# Patient Record
Sex: FEMALE | Race: BLACK OR AFRICAN AMERICAN | NOT HISPANIC OR LATINO | Employment: OTHER | RURAL
[De-identification: names, ages, dates, MRNs, and addresses within clinical notes are randomized per-mention and may not be internally consistent; named-entity substitution may affect disease eponyms.]

---

## 2023-03-15 ENCOUNTER — HOSPITAL ENCOUNTER (EMERGENCY)
Facility: HOSPITAL | Age: 78
Discharge: HOME | End: 2023-03-15
Payer: COMMERCIAL

## 2023-03-15 VITALS
SYSTOLIC BLOOD PRESSURE: 130 MMHG | HEIGHT: 61 IN | HEART RATE: 51 BPM | DIASTOLIC BLOOD PRESSURE: 64 MMHG | WEIGHT: 183 LBS | TEMPERATURE: 98 F | BODY MASS INDEX: 34.55 KG/M2 | RESPIRATION RATE: 18 BRPM | OXYGEN SATURATION: 99 %

## 2023-03-15 DIAGNOSIS — S80.00XA CONTUSION OF KNEE, UNSPECIFIED LATERALITY, INITIAL ENCOUNTER: Primary | ICD-10-CM

## 2023-03-15 PROCEDURE — 99284 PR EMERGENCY DEPT VISIT,LEVEL IV: ICD-10-PCS | Mod: EDII,,, | Performed by: NURSE PRACTITIONER

## 2023-03-15 PROCEDURE — 99284 EMERGENCY DEPT VISIT MOD MDM: CPT | Mod: GF

## 2023-03-15 PROCEDURE — 99284 EMERGENCY DEPT VISIT MOD MDM: CPT | Mod: EDII,,, | Performed by: NURSE PRACTITIONER

## 2023-03-15 PROCEDURE — 96372 THER/PROPH/DIAG INJ SC/IM: CPT | Performed by: NURSE PRACTITIONER

## 2023-03-15 PROCEDURE — 63600175 PHARM REV CODE 636 W HCPCS: Performed by: NURSE PRACTITIONER

## 2023-03-15 PROCEDURE — 99284 EMERGENCY DEPT VISIT MOD MDM: CPT

## 2023-03-15 RX ORDER — FOLIC ACID 1 MG/1
TABLET ORAL
COMMUNITY
Start: 2023-02-02

## 2023-03-15 RX ORDER — ATORVASTATIN CALCIUM 10 MG/1
TABLET, FILM COATED ORAL
COMMUNITY
Start: 2022-04-25

## 2023-03-15 RX ORDER — OMEPRAZOLE 20 MG/1
CAPSULE, DELAYED RELEASE ORAL
COMMUNITY
Start: 2022-04-25

## 2023-03-15 RX ORDER — CYCLOBENZAPRINE HCL 10 MG
TABLET ORAL
COMMUNITY
Start: 2022-05-25

## 2023-03-15 RX ORDER — DULOXETIN HYDROCHLORIDE 30 MG/1
CAPSULE, DELAYED RELEASE ORAL
COMMUNITY
Start: 2022-04-25

## 2023-03-15 RX ORDER — NAPROXEN 250 MG/1
250 TABLET ORAL
Qty: 30 TABLET | Refills: 0 | Status: SHIPPED | OUTPATIENT
Start: 2023-03-15

## 2023-03-15 RX ORDER — KETOROLAC TROMETHAMINE 30 MG/ML
30 INJECTION, SOLUTION INTRAMUSCULAR; INTRAVENOUS
Status: COMPLETED | OUTPATIENT
Start: 2023-03-15 | End: 2023-03-15

## 2023-03-15 RX ORDER — METHOTREXATE 2.5 MG/1
TABLET ORAL
COMMUNITY

## 2023-03-15 RX ORDER — LISINOPRIL 20 MG/1
TABLET ORAL
COMMUNITY
Start: 2022-11-17

## 2023-03-15 RX ORDER — CELECOXIB 200 MG/1
CAPSULE ORAL
COMMUNITY
Start: 2022-11-09

## 2023-03-15 RX ADMIN — KETOROLAC TROMETHAMINE 30 MG: 30 INJECTION, SOLUTION INTRAMUSCULAR; INTRAVENOUS at 11:03

## 2023-03-15 NOTE — ED NOTES
"Patient denies any radiation from back down to extremities.  States felt a "jarring" sensation into her back when she tripped over a pallet while shopping in a store.  States she fell forward onto bilateral knees and hands.  Denies hitting head or any other trauma.    "

## 2023-03-15 NOTE — ED NOTES
Patient unable to recall full list of medications taken, reviewed RX in system but patient unsure of full list of medications

## 2023-03-15 NOTE — ED PROVIDER NOTES
Encounter Date: 3/15/2023       History     Chief Complaint   Patient presents with    Fall     C/o tripping and falling approx 30 minutes PTA; states no loss of consciousness or syncope, states fell forward hitting bilateral knees and hands.   C/O bilateral knee pain and lower back pain, drove self to ED     76 y/o AAF presents pov with c/o bilateral knee pain and low back pain after tripping on a pallet in [place] and falling on her hands and knees minutes pta.    Review of patient's allergies indicates:   Allergen Reactions    Codeine Itching    Pcn [penicillins]      itching     Past Medical History:   Diagnosis Date    Hypertension      Past Surgical History:   Procedure Laterality Date    CHOLECYSTECTOMY      HYSTERECTOMY       History reviewed. No pertinent family history.  Social History     Tobacco Use    Smoking status: Every Day     Packs/day: 0.50     Years: 15.00     Pack years: 7.50     Types: Cigarettes    Smokeless tobacco: Never   Substance Use Topics    Alcohol use: Never    Drug use: Never     Review of Systems   Respiratory:  Negative for apnea, cough, choking, chest tightness, shortness of breath, wheezing and stridor.    Cardiovascular:  Negative for chest pain, palpitations and leg swelling.   Musculoskeletal:  Positive for arthralgias and back pain.        Bilateral knee pain   All other systems reviewed and are negative.    Physical Exam     Initial Vitals [03/15/23 1021]   BP Pulse Resp Temp SpO2   (!) 142/78 65 18 97.8 °F (36.6 °C) 98 %      MAP       --         Physical Exam    Nursing note and vitals reviewed.  Constitutional: She appears well-developed and well-nourished. No distress.   HENT:   Head: Normocephalic and atraumatic.   Eyes: Conjunctivae and EOM are normal.   Neck: Neck supple.   Normal range of motion.  Cardiovascular:  Normal rate, regular rhythm, normal heart sounds and intact distal pulses.     Exam reveals no gallop and no friction rub.       No murmur  heard.  Pulmonary/Chest: Breath sounds normal. No respiratory distress. She has no wheezes. She has no rhonchi. She has no rales. She exhibits no tenderness.   Abdominal: Abdomen is soft. Bowel sounds are normal. There is no abdominal tenderness.   Musculoskeletal:      Cervical back: Normal, normal range of motion and neck supple.      Thoracic back: Normal.      Lumbar back: Normal.      Right knee: Normal.      Left knee: Normal.     Neurological: She is alert and oriented to person, place, and time. She has normal strength.   Skin: Skin is warm and dry. Capillary refill takes less than 2 seconds.   Psychiatric: She has a normal mood and affect. Her behavior is normal. Judgment and thought content normal.       Medical Screening Exam   See Full Note    ED Course   Procedures  Labs Reviewed - No data to display       Imaging Results              X-Ray Knee 1 or 2 View Right (Final result)  Result time 03/15/23 11:09:15      Final result by Cuong Hameed DO (03/15/23 11:09:15)                   Impression:      No acute fracture or dislocation.    Ill-defined sclerosis located within the proximal diaphysis of the bilateral tibias measuring 3.3 cm on the right and 3.8 cm on the left , could represent bony infarct or enchondroma. Correlate with need for MRI of the knee without and with intravenous contrast.      Electronically signed by: Cuong Hameed  Date:    03/15/2023  Time:    11:09               Narrative:    EXAMINATION:  XR KNEE 1 OR 2 VIEW LEFT; XR KNEE 1 OR 2 VIEW RIGHT    CLINICAL HISTORY:  Fall;; FAll;    TECHNIQUE:  XR KNEE 1 OR 2 VIEW LEFT; XR KNEE 1 OR 2 VIEW RIGHT    COMPARISON:  3/15/23    FINDINGS:  No acute fracture or dislocation.    Moderate degenerative change of the compartments of the knees.    Ill-defined sclerosis located within the proximal diaphysis of the bilateral tibias measuring 3.3 cm on the right and 3.8 cm on the left , could represent bony infarct or enchondroma.   Correlate with need for MRI of the knee without and with intravenous contrast.    No radiopaque foreign bodies.                                       X-Ray Knee 1 or 2 View Left (Final result)  Result time 03/15/23 11:09:15      Final result by Cuong Hameed DO (03/15/23 11:09:15)                   Impression:      No acute fracture or dislocation.    Ill-defined sclerosis located within the proximal diaphysis of the bilateral tibias measuring 3.3 cm on the right and 3.8 cm on the left , could represent bony infarct or enchondroma. Correlate with need for MRI of the knee without and with intravenous contrast.      Electronically signed by: Cuong Hameed  Date:    03/15/2023  Time:    11:09               Narrative:    EXAMINATION:  XR KNEE 1 OR 2 VIEW LEFT; XR KNEE 1 OR 2 VIEW RIGHT    CLINICAL HISTORY:  Fall;; FAll;    TECHNIQUE:  XR KNEE 1 OR 2 VIEW LEFT; XR KNEE 1 OR 2 VIEW RIGHT    COMPARISON:  3/15/23    FINDINGS:  No acute fracture or dislocation.    Moderate degenerative change of the compartments of the knees.    Ill-defined sclerosis located within the proximal diaphysis of the bilateral tibias measuring 3.3 cm on the right and 3.8 cm on the left , could represent bony infarct or enchondroma.  Correlate with need for MRI of the knee without and with intravenous contrast.    No radiopaque foreign bodies.                                       X-Ray Lumbar Spine Ap And Lateral (Final result)  Result time 03/15/23 11:10:34      Final result by Cuong Hameed DO (03/15/23 11:10:34)                   Impression:      Questionable mild anterior wedging of L1, correlate with point tenderness.      Electronically signed by: Cuong Hameed  Date:    03/15/2023  Time:    11:10               Narrative:    EXAMINATION:  XR LUMBAR SPINE AP AND LATERAL    CLINICAL HISTORY:  Fall;.    TECHNIQUE:  XR LUMBAR SPINE AP AND LATERAL    COMPARISON:  None    FINDINGS:  Questionable mild anterior wedging of L1,  correlate with point tenderness.    No malalignment.    Moderate to severe degenerative disc disease.    Mild to moderate endplate change.  Moderate facet change.                                       Medications   ketorolac injection 30 mg (30 mg Intramuscular Given 3/15/23 1136)     Medical Decision Making:   Initial Assessment:   76 y/o AAF presents pov with c/o bilateral knee pain and low back pain after tripping on a pallet in [place] and falling on her hands and knees minutes pta.  Differential Diagnosis:   Low back Pain  Contusions Bilateral Knees  Patella Fracture  ED Management:  Patient supine on stretcher in ER # 1 in [facility].                 Clinical Impression:   Final diagnoses:  [S80.00XA] Contusion of knee, unspecified laterality, initial encounter (Primary)        ED Disposition Condition    Discharge Stable          ED Prescriptions       Medication Sig Dispense Start Date End Date Auth. Provider    naproxen (NAPROSYN) 250 MG tablet Take 1 tablet (250 mg total) by mouth every meal as needed (knee pain). 30 tablet 3/15/2023 -- SATHYA Antonio          Follow-up Information       Follow up With Specialties Details Why Contact Info    Bal Alfaro NP Family Medicine Go to  As needed, for follow up [address] [place] [address] [place] [id number]  [phone]               SATHYA Antonio  03/15/23 1226
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
## 2023-03-20 ENCOUNTER — CLINICAL SUPPORT (OUTPATIENT)
Dept: REHABILITATION | Facility: HOSPITAL | Age: 78
End: 2023-03-20
Payer: COMMERCIAL
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
## 2023-03-20 PROCEDURE — 97163 PT EVAL HIGH COMPLEX 45 MIN: CPT

## 2023-03-20 NOTE — PLAN OF CARE
[facility]  Physical Therapy Initial Evaluation    Name: Rianna Devi  Clinic Number: [id number]    Therapy Diagnosis:   Encounter Diagnosis   Name Primary?    Fall      Physician: Khruram Mcdowell MD    Physician Orders: PT Eval and Treat, Balance training  Medical Diagnosis from Referral: s/p Fall  Evaluation Date: 3/20/2023  Authorization Period Expiration: 4/28/2023    Visit # / Visits authorized: 1/ 11    Time In: 1232  Time Out: 1307  Total Appointment Time (timed & untimed codes): 35 minutes    Precautions: Fall and RA, HTN    Subjective   Date of onset: 3/15/2023  History of current condition - Rianna reports: Tripping over a pallet at a store and landing on olivier hands/knees. States the fall exacerbated her RA symptoms and she now has 10/10 pain in olivier knees, low back, and olivier shoulders. She also c/o pain in her olivier waist area.     Medical History:   Past Medical History:   Diagnosis Date    Hypertension    Rheumatoid Arthritis, per pt    Surgical History:   Rianna Devi  has a past surgical history that includes Hysterectomy and Cholecystectomy.    Medications:   Rianna has a current medication list which includes the following prescription(s): atorvastatin, celecoxib, cyclobenzaprine, duloxetine, folic acid, lisinopril, methotrexate, naproxen, and omeprazole.    Allergies:   Review of patient's allergies indicates:   Allergen Reactions    Codeine Itching    Pcn [penicillins]      itching        Imaging, xrays 3/15/23 of lumbar spine and olivier knees: no fractures/dislocation of knees, mild anterior wedging L1. No malalignment of L-spine.    Prior Therapy: none  Social History:  lives alone  Occupation: Retired  Prior Level of Function: Could do yard work, house work. Amb'd without AD.Could drive herself.  Current Level of Function: Having to amb with RW.    Pain:  Current 10/10, worst 10/10, best 10/10   Location: bilateral low back , knees , and shoulders, waists  Description: Aching, Dull, and  ""stiff and sore"  Aggravating Factors: Bending, Walking, and Getting out of bed/chair  Easing Factors: pain medication, lying down, and rest    Pts goals: Decrease pain.    Objective     Comments: tight olivier glutes, calves. Pt was self-limiting throughout ROM/MMT due to c/o pain. She has painful trigger points about olivier hips. Mild edema olivier knees.      Range of motion:  Motion Right Left    Hip flexion   55   35   Hip extension       Hip abduction  WFL  WFL   Hip adduction       Internal rotation       External rotation       Knee extension   0   0   Knee flexion   92   105   Ankle DF   5   0                      Manual muscle test   Muscle Right  Left    Hip flexion  MMT strength: 3-/5  MMT strength: 3-/5   Hip extension       Hip abduction  MMT strength: 3+/5  MMT strength: 3+/5   Hip adduction       Hip internal rotation       Hip external rotation       Knee extension  MMT strength: 4-/5  MMT strength: 3+/5   Knee flexion  MMT strength: 3-/5  MMT strength: 4-/5                         Range of motion  Motion Right  Left    Shoulder flexion 95 100   Shoulder extension     Shoulder abduction 110 105     MANUAL MUSCLE TEST  Muscle Right  Left    Shoulder flexion MMT strength: 3/5 MMT strength: 3/5   Shoulder extension     Shoulder abduction MMT strength: 3/5 MMT strength: 3/5     Assistive device: rolling walker  Ambulation distance and deviations:HOUSEHOLD distances    Comments: TINETTI BALANCE test = 14/28 using RW        Limitation/Restriction for FOTO Intake Survey    Therapist reviewed FOTO scores for Rianna Devi on 3/20/2023.   FOTO documents entered into EPIC - see Media section.    Limitation Score: 36%         TREATMENT   Treatment Time In: 1232  Treatment Time Out: 1307  Total Treatment time (time-based codes) separate from Evaluation: 5 minutes    Rianna received the treatments listed below:  HEP    Home Exercises and Patient Education Provided    Education provided:   - Pt instructed in results of eval, " "POC, treatment options.    Written Home Exercises Provided: yes. LTR, supine hip ABD/ER  Exercises were reviewed and Rianna was able to demonstrate them prior to the end of the session.  Rianna demonstrated fair  understanding of the education provided.     See EMR under Media for exercises provided 3/20/2023.    Assessment   Rianna is a 77 y.o. female referred to outpatient Physical Therapy with a medical diagnosis of s/p fall. Pt presents with decreased ROM and strength olivier shoulders and BLE. She is at high falls risk with Tinetti Balance score of only 14/28 using RW. Prior to fall, pt did not use any assistive device. She states that she has had 10/10 pain in olivier shoulders, olivier knees, low back and olivier waist since her fall five days ago, but describes her pain as "stiff and sore". Pt is very self-limiting during ROM and MMT due to her c/o pain. PT indicated to decrease pain using manual therapy and modalities, increase strength of BUE/BLE, increase ROM BUE/BLE and decrease her falls risk.    Pt prognosis is Good.   Pt will benefit from skilled outpatient Physical Therapy to address the deficits stated above and in the chart below, provide pt/family education, and to maximize pt's level of independence.     Plan of care discussed with patient: Yes  Pt's spiritual, cultural and educational needs considered and patient is agreeable to the plan of care and goals as stated below:     Anticipated Barriers for therapy: none      Goals:  Short Term Goals: 3 weeks   Pt will be indep with HEP.  Pt's Tinetti Balance score will be at least 17/28.  Pt will have at least 3+/5 olivier shoulder flex and abd.  Pt will have at least 4-/5 BLE strength.  Pt will gain at least 10 degrees ROM olivier shoulder flex and abd.  t will have ROM olivier  olivier knee flex 100 degrees, olivier hip flex 80 degrees.    Long Term Goals: 5 weeks   Pt's Tinetti Balance score will be at least 20/28.  Pt will have at least 4-/5 olivier shoulder flex and abd.  Pt will have " at least 4/5 BLE strength.  Pt will have ROM olivier shoulder flex and abd at least 120 degrees.  Pt will have ROM olivier calves 10 degrees, olivier knee flex 110 degrees, olivier hip flex 100 degrees.    Plan   Plan of care Certification: 3/20/2023 to 4/28/2023.    Outpatient Physical Therapy 2 times weekly for 5 weeks to include the following interventions: Electrical Stimulation  , Manual Therapy, Moist Heat/ Ice, Patient Education, Therapeutic Activities, Therapeutic Exercise, and Ultrasound.     Gabbie Weathers, PT     I CERTIFY THE NEED FOR THESE SERVICES FURNISHED UNDER THIS PLAN OF TREATMENT AND WHILE UNDER MY CARE.    Physician's comments:      Physician's Signature: _________________________________________  Date: __________________________

## 2023-03-22 ENCOUNTER — CLINICAL SUPPORT (OUTPATIENT)
Dept: REHABILITATION | Facility: HOSPITAL | Age: 78
End: 2023-03-22
Payer: COMMERCIAL
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
## 2023-03-22 PROCEDURE — 97110 THERAPEUTIC EXERCISES: CPT | Mod: CQ

## 2023-03-22 NOTE — PROGRESS NOTES
"[facility]   Physical Therapy Treatment Note     Name: Rianna Devi  Clinic Number: [id number]    Therapy Diagnosis:   Encounter Diagnosis   Name Primary?    Fall, sequela Yes     Physician: Khurram Mcdowell MD    Visit Date: 3/22/2023    Physician Orders: PT Eval and Treat, Balance training  Medical Diagnosis from Referral: s/p Fall  Evaluation Date: 3/20/2023  Authorization Period Expiration: 4/28/2023     Visit # / Visits authorized: 2/11     Time In: 13:12  Time Out: 14:06  Total Appointment Time (timed & untimed codes): 54 minutes (Non billable 5 minutes for MHP)     Precautions: Fall and RA, HTN       PTA Visit #: 1/5       SUBJECTIVE     Pt reports: Patient stated that her shoulder, arms, and legs were sore prior to treatment. Patient stated that she soaked in the tub with warm water and epsom salt this morning, but she stated that she is still sore. Patient stated that her pain pills just put her to sleep.   She was compliant with home exercise program.  Response to previous treatment: No treatment last visit only evaluation.   Functional change: Too early in POC    Pain: 10/10  Location: bilateral shoulders, arms, and legs      OBJECTIVE     Objective Measures updated at progress report unless specified.     Treatment     Rianna received the treatments listed below:      therapeutic exercises to develop strength and ROM for 49 minutes including:     Nustep  5 minutes L1   Seated quad stretch 2 x 30"   Seated HS stretch 2 x 30"    Lower trunk rotations  3 x 5"   Hip ADD squeezes 15x 3" hold   Hip ABD with red TB 10x   Pulleys flex/abd  2 min each with short hold at end ROM                         manual therapy techniques: NA were applied to the: NA for NA minutes    therapeutic activities to improve functional performance for NA  minutes, including:  Not today    gait training to improve functional mobility and safety for NA  minutes, including:  Not today    direct contact " modalities after being cleared for contraindications: Ultrasound:  Rianna received ultrasound to manage pain and inflammation at NA % duty cycle applied to the NA at an intensity of  number entry box W/cm2  for a duration of NA minutes. Patient tolerated treatment well without adverse effects. Therapist was in attendance throughout intervention. Not today    supervised modalities after being cleared for contradictions: IFC Electrical Stimulation:  Rianna received IFC Electrical Stimulation for pain control applied to the NA. Pt received stimulation at NA % scan at a frequency of NA for NA minutes. Rianna tolderated treatment well without any adverse effects. Not today    hot pack for 5 minutes to bilateral shoulders.    cold pack for 0 minutes to NA.        Patient Education and Home Exercises     Home Exercises Provided and Patient Education Provided     Written Home Exercises Provided: Patient instructed to cont prior HEP. Exercises were reviewed and Rianna was able to demonstrate them prior to the end of the session.  Rianna demonstrated good  understanding of the education provided. See EMR under Patient Instructions for exercises provided during therapy sessions    ASSESSMENT   Patient was provided with visual and verbal cues for proper and hold times of exercises and stretches. Patient frequently reports soreness and discomfort throughout exercises. Patient stated that her R knee popped after quad stretch, and she stated that she had pain in her groin during hip ABD. MHP applied to bilateral shoulders to decrease soreness and pain. Patient stated that her pain decreased to a 7/10 after completion of treatment.     Rianna Is progressing well towards her goals.   Pt prognosis is Good.     Pt will continue to benefit from skilled outpatient physical therapy to address the deficits listed in the problem list box on initial evaluation, provide pt/family education and to maximize pt's level of independence in the  home and community environment.     Pt's spiritual, cultural and educational needs considered and pt agreeable to plan of care and goals.     Anticipated Barriers for therapy: none        Goals:  Short Term Goals: 3 weeks   Pt will be indep with HEP.  Pt's Tinetti Balance score will be at least 17/28.  Pt will have at least 3+/5 olivier shoulder flex and abd.  Pt will have at least 4-/5 BLE strength.  Pt will gain at least 10 degrees ROM olivier shoulder flex and abd.  t will have ROM olivier  olivier knee flex 100 degrees, olivier hip flex 80 degrees.     Long Term Goals: 5 weeks   Pt's Tinetti Balance score will be at least 20/28.  Pt will have at least 4-/5 olivier shoulder flex and abd.  Pt will have at least 4/5 BLE strength.  Pt will have ROM olivier shoulder flex and abd at least 120 degrees.  Pt will have ROM olivier calves 10 degrees, olivier knee flex 110 degrees, olivier hip flex 100 degrees.    PLAN     Plan of care Certification: 3/20/2023 to 4/28/2023.     Outpatient Physical Therapy 2 times weekly for 5 weeks to include the following interventions: Electrical Stimulation  , Manual Therapy, Moist Heat/ Ice, Patient Education, Therapeutic Activities, Therapeutic Exercise, and Ultrasound.    Swapna Tovar, PTA

## 2023-03-24 ENCOUNTER — CLINICAL SUPPORT (OUTPATIENT)
Dept: REHABILITATION | Facility: HOSPITAL | Age: 78
End: 2023-03-24
Payer: COMMERCIAL
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
## 2023-03-24 PROCEDURE — 97035 APP MDLTY 1+ULTRASOUND EA 15: CPT

## 2023-03-24 PROCEDURE — 97140 MANUAL THERAPY 1/> REGIONS: CPT

## 2023-03-24 PROCEDURE — 97110 THERAPEUTIC EXERCISES: CPT

## 2023-03-24 NOTE — PROGRESS NOTES
"[facility]   Physical Therapy Treatment Note     Name: Rianna Devi  Clinic Number: [id number]    Therapy Diagnosis:   No diagnosis found.    Physician: Khurram Mcdowell MD    Visit Date: 3/24/2023    Physician Orders: PT Eval and Treat, Balance training  Medical Diagnosis from Referral: s/p Fall  Evaluation Date: 3/20/2023  Authorization Period Expiration: 4/28/2023     Visit # / Visits authorized: 3/11    Time In: 1306  Time Out: 1350  Total Appointment Time (timed & untimed codes): 44 minutes     Precautions: Fall and RA, HTN       PTA Visit #: 0/5       SUBJECTIVE     Pt reports: Patient reports using heat at neck and cold pack to knees.  She was compliant with home exercise program.  Response to previous treatment: No treatment last visit only evaluation.   Functional change: Too early in POC    Pain: 8/10  Location: left low back, left neck/shldr    OBJECTIVE     Objective Measures updated at progress report unless specified.     Treatment     Rianna received the treatments listed below:      therapeutic exercises to develop strength and ROM for 12 minutes including:     Nustep , Level 2.5 7 minutes    Seated quad stretch 2 x 30" (not today)   Seated HS stretch 2 x 30" (not today)   Lower trunk rotations  3 x 5" (not today)   Hip ADD squeezes 15x 3" hold (not today)   Hip ABD with red TB 10x   Pulleys flex/abd  2 min each with short hold at end ROM (not today)   Stretch: olivier pirif, glutes, HS (passive) 1x30" each                     manual therapy techniques: Trigger point release about left hip. Foam rolling olivier quads, olivier glutes, low back, olivier HS: for 24 minutes    therapeutic activities to improve functional performance for NA  minutes, including:  (Not today)    direct contact modalities after being cleared for contraindications: Ultrasound:  Rianna received ultrasound to manage pain and inflammation at 100 % duty cycle applied to the Left Neck/Upper trap at an intensity of  " number 1.5 W/cm2  for a duration of 7 minutes. Patient tolerated treatment well without adverse effects. Therapist was in attendance throughout intervention. Total time= 8 minutes including set up/clean up.    supervised modalities after being cleared for contradictions: IFC Electrical Stimulation:  Rianna received IFC Electrical Stimulation for pain control applied to the NA. Pt received stimulation at NA % scan at a frequency of NA for NA minutes. Rianna tolderated treatment well without any adverse effects. (Not today)    hot pack for 0 minutes to bilateral shoulders.    cold pack for 0 minutes to NA.        Patient Education and Home Exercises     Home Exercises Provided and Patient Education Provided     Written Home Exercises Provided: yes. Piriformis stretch and Glute stretch. Exercises were reviewed and Rianna was able to demonstrate them prior to the end of the session.  Rianna demonstrated good  understanding of the education provided. See EMR under Patient Instructions for exercises provided during therapy sessions    ASSESSMENT   Patient's pain in knees not present during visit. She only c/o pain in left neck/upper and left low back. Pain in these two areas decreased from 8/10 to 6/10 post-treatment. Added to HEP: glute stretch, piriformis stretch.    Rianna Is progressing well towards her goals.   Pt prognosis is Good.     Pt will continue to benefit from skilled outpatient physical therapy to address the deficits listed in the problem list box on initial evaluation, provide pt/family education and to maximize pt's level of independence in the home and community environment.     Pt's spiritual, cultural and educational needs considered and pt agreeable to plan of care and goals.     Anticipated Barriers for therapy: none        Goals:  Short Term Goals: 3 weeks   Pt will be indep with HEP.  Pt's Tinetti Balance score will be at least 17/28.  Pt will have at least 3+/5 olivier shoulder flex and abd.  Pt will  have at least 4-/5 BLE strength.  Pt will gain at least 10 degrees ROM olivier shoulder flex and abd.  Pt will have ROM olivier  olivier knee flex 100 degrees, olivier hip flex 80 degrees.     Long Term Goals: 5 weeks   Pt's Tinetti Balance score will be at least 20/28.  Pt will have at least 4-/5 olivier shoulder flex and abd.  Pt will have at least 4/5 BLE strength.  Pt will have ROM olivier shoulder flex and abd at least 120 degrees.  Pt will have ROM olivier calves 10 degrees, olivier knee flex 110 degrees, olivier hip flex 100 degrees.    PLAN     Plan of care Certification: 3/20/2023 to 4/28/2023.     Outpatient Physical Therapy 2 times weekly for 5 weeks to include the following interventions: Electrical Stimulation  , Manual Therapy, Moist Heat/ Ice, Patient Education, Therapeutic Activities, Therapeutic Exercise, and Ultrasound.    Gabbie Weathers, PT      NEXT VISIT: measure ROM olivier knee flex, hip flex, shldr flex and shldr abd. TPR to left neck/UT. MMT olivier shldr flex and abd.

## 2023-03-29 ENCOUNTER — CLINICAL SUPPORT (OUTPATIENT)
Dept: REHABILITATION | Facility: HOSPITAL | Age: 78
End: 2023-03-29
Payer: COMMERCIAL
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
## 2023-03-29 PROCEDURE — 97014 ELECTRIC STIMULATION THERAPY: CPT | Mod: CQ

## 2023-03-29 PROCEDURE — 97110 THERAPEUTIC EXERCISES: CPT | Mod: CQ

## 2023-03-29 NOTE — PROGRESS NOTES
"[facility]   Physical Therapy Treatment Note     Name: Rianna Devi  Clinic Number: [id number]    Therapy Diagnosis:   Encounter Diagnosis   Name Primary?    Fall, sequela Yes       Physician: Khurram Mcdowell MD    Visit Date: 3/29/2023    Physician Orders: PT Eval and Treat, Balance training  Medical Diagnosis from Referral: s/p Fall  Evaluation Date: 3/20/2023  Authorization Period Expiration: 4/28/2023     Visit # / Visits authorized: 4/11    Time In: 1232  Time Out: 1334  Total Appointment Time (timed & untimed codes): 58 minutes     Precautions: Fall and RA, HTN       PTA Visit #: 1/5       SUBJECTIVE     Pt reports: Patient stated that her arms are throbbing and her legs are hurting prior to treatment. Patient stated that she feels lie she does not have good.   She was compliant with home exercise program.  Response to previous treatment: No treatment last visit only evaluation.   Functional change: Too early in POC    Pain: 7/10  Location: Bilateral low back, hips, legs, and shoulders    OBJECTIVE     L knee flex: 130, R knee flex: 125 degrees     L hip flex: 90, R hip flex: 95 degrees    L shoulder flex: 142 degrees , R shoulder flex: 127 degrees    L shoulder ABD: 172 degrees ,R shoulder ABD: 172 degrees     MMT of bilateral shoulder flexion and abd: all 3+/5    Treatment     Rianna received the treatments listed below:      therapeutic exercises to develop strength and ROM for 48 minutes including:     Nustep , Level 2.5 5 minutes    Seated quad stretch 2 x 30" (not today)   Seated HS stretch 2 x 30"    Lower trunk rotations  3 x 5"    Hip ADD squeezes 15x 3" hold    Hip ABD with red TB 10x   Pulleys flex/abd  2 min each with short hold at end ROM (not today)   Stretch: glutes 1x30" each                     manual therapy techniques: Trigger point release about left hip. Foam rolling olivier quads, olivier glutes, low back, olivier HS: for 0 minutes Not today    therapeutic activities to " improve functional performance for NA  minutes, including:  (Not today)    direct contact modalities after being cleared for contraindications: Ultrasound:  Rianna received ultrasound to manage pain and inflammation at 100 % duty cycle applied to the Left Neck/Upper trap at an intensity of  number 1.5 W/cm2  for a duration of 0 minutes. Patient tolerated treatment well without adverse effects. Therapist was in attendance throughout intervention. Not today     supervised modalities after being cleared for contradictions: Biphasic Electrical Stimulation:  Rianna received Biphasic Electrical Stimulation for pain control applied to the bilateral piriformis and quadratus lumborum. Pt received stimulation on burst mode at 2pps for 10 minutes. Rianna tolderated treatment well without any adverse effects.     hot pack for 10 minutes to bilateral low back with estim.     cold pack for 0 minutes to NA.        Patient Education and Home Exercises     Home Exercises Provided and Patient Education Provided     Written Home Exercises Provided: Patient instructed to cont prior HEP. Exercises were reviewed and Rianna was able to demonstrate them prior to the end of the session.  Rianna demonstrated good  understanding of the education provided. See EMR under Patient Instructions for exercises provided during therapy sessions    ASSESSMENT   Patient met short term goals 3, 5, and 6. Patient also met long term goal 4. Patient continues to required visual, verbal, and tactile cues for proper form and hold times of therapy tasks. After completion of treatment patient stated that she felt better, and stated that her pain decreased to a 6/10.     Rianna Is progressing well towards her goals.   Pt prognosis is Good.     Pt will continue to benefit from skilled outpatient physical therapy to address the deficits listed in the problem list box on initial evaluation, provide pt/family education and to maximize pt's level of independence in  the home and community environment.     Pt's spiritual, cultural and educational needs considered and pt agreeable to plan of care and goals.     Anticipated Barriers for therapy: none        Goals:  Short Term Goals: 3 weeks   Pt will be indep with HEP.  Pt's Tinetti Balance score will be at least 17/28.  Pt will have at least 3+/5 olivier shoulder flex and abd. MET  Pt will have at least 4-/5 BLE strength.  Pt will gain at least 10 degrees ROM olivier shoulder flex and abd. MET  Pt will have ROM olivier  olivier knee flex 100 degrees, olivier hip flex 80 degrees. MET     Long Term Goals: 5 weeks   Pt's Tinetti Balance score will be at least 20/28.  Pt will have at least 4-/5 olivier shoulder flex and abd.  Pt will have at least 4/5 BLE strength.  Pt will have ROM olivier shoulder flex and abd at least 120 degrees. MET  Pt will have ROM olivier calves 10 degrees, olivier knee flex 110 degrees, olivier hip flex 100 degrees.    PLAN     Plan of care Certification: 3/20/2023 to 4/28/2023.     Outpatient Physical Therapy 2 times weekly for 5 weeks to include the following interventions: Electrical Stimulation  , Manual Therapy, Moist Heat/ Ice, Patient Education, Therapeutic Activities, Therapeutic Exercise, and Ultrasound.    Swapna Tovar, PTA

## 2023-03-31 ENCOUNTER — CLINICAL SUPPORT (OUTPATIENT)
Dept: REHABILITATION | Facility: HOSPITAL | Age: 78
End: 2023-03-31
Payer: COMMERCIAL
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
## 2023-03-31 PROCEDURE — 97110 THERAPEUTIC EXERCISES: CPT

## 2023-03-31 PROCEDURE — 97530 THERAPEUTIC ACTIVITIES: CPT

## 2023-03-31 NOTE — PROGRESS NOTES
"[facility]   Physical Therapy Treatment Note     Name: Rianna Devi  Clinic Number: [id number]    Therapy Diagnosis:   No diagnosis found.      Physician: Khurram Mcdowell MD    Visit Date: 3/31/2023    Physician Orders: PT Eval and Treat, Balance training  Medical Diagnosis from Referral: s/p Fall  Evaluation Date: 3/20/2023  Authorization Period Expiration: 4/28/2023     Visit # / Visits authorized: 5/11    Time In: 1310  Time Out: 1400  Total Appointment Time (timed & untimed codes): 50 minutes     Precautions: Fall and RA, HTN       PTA Visit #: 0/5       SUBJECTIVE     Pt reports: Patient stated that she uses QC for amb indoors and RW outdoors. C/o not feeling steady.   She was compliant with home exercise program.  Response to previous treatment: No treatment last visit only evaluation.   Functional change: Too early in POC    Pain: 6/10  Location: Bilateral knees (quads and HS), olivier deltoids    OBJECTIVE     L knee flex: 130, R knee flex: 125 degrees     L hip flex: 90, R hip flex: 95 degrees    L shoulder flex: 142 degrees , R shoulder flex: 127 degrees    L shoulder ABD: 172 degrees ,R shoulder ABD: 172 degrees     MMT of bilateral shoulder flexion and abd: all 3+/5    Treatment     Rianna received the treatments listed below:      therapeutic exercises to develop strength and ROM for  35 minutes including:     Nustep , Level 3.0 5 minutes    Seated quad stretch 2 x 30" (not today)   Seated HS stretch BLE 2 x 30"    Lower trunk rotations  3 x 5" (not today)   Hip ADD squeezes 15x 3" hold (not today)   Hip ABD with red TB 10x  (not today)   Pulleys flex/abd  2 min each with short hold at end ROM (not today)   *Stretch: glutes 1x30" each  (not today)   *Standing: MS, hip abd, PF, marching 1x10 each   BUE ex's: shldr flex 1#, shld abd 1# 1x10 each   BUE ex's: Empty Can 12 reps         manual therapy techniques: Trigger point release about left hip. Foam rolling olivier quads, olivier glutes, " "low back, [place] HS: for 0 minutes (Not today)    therapeutic activities to improve functional performance for balance 15 minutes, including:  Tandem amb fwd/bwd 3 ft x 2 reps, braiding R/L 3 ft x 2 reps, toe taps to 8" stool x 10 reps. Pt required 2 seated rest breaks.    direct contact modalities after being cleared for contraindications: Ultrasound:  Rianna received ultrasound to manage pain and inflammation at 100 % duty cycle applied to the Left Neck/Upper trap at an intensity of  number 1.5 W/cm2  for a duration of 0 minutes. Patient tolerated treatment well without adverse effects. Therapist was in attendance throughout intervention. (Not today)    supervised modalities after being cleared for contradictions: Biphasic Electrical Stimulation:  Rianna received Biphasic Electrical Stimulation for pain control applied to the bilateral piriformis and quadratus lumborum. Pt received stimulation on burst mode at 2pps for 10 minutes. Rianna tolderated treatment well without any adverse effects.  (Not today)    hot pack for 0 minutes to bilateral low back with estim.     cold pack for 0 minutes to NA.(Pt declined CP to knees stating she would use her CP at home)        Patient Education and Home Exercises     Home Exercises Provided and Patient Education Provided -pt instructed to stop performing previous HEP: LTR supine hip abd.    Written Home Exercises Provided: yes. Standing HEP: MS, PF, hip abd, marching.Exercises were reviewed and Rianna was able to demonstrate them prior to the end of the session.  Rianna demonstrated good  understanding of the education provided. See EMR under Patient Instructions for exercises provided during therapy sessions    ASSESSMENT   Patient reported [place] arm/knee pain decreased from 6/10 to 5/10 post-treatment.    Rianna Is progressing well towards her goals.   Pt prognosis is Good.     Pt will continue to benefit from skilled outpatient physical therapy to address the deficits listed in " the problem list box on initial evaluation, provide pt/family education and to maximize pt's level of independence in the home and community environment.     Pt's spiritual, cultural and educational needs considered and pt agreeable to plan of care and goals.     Anticipated Barriers for therapy: none        Goals:  Short Term Goals: 3 weeks   Pt will be indep with HEP.  Pt's Tinetti Balance score will be at least 17/28.  Pt will have at least 3+/5 olivier shoulder flex and abd.- MET  Pt will have at least 4-/5 BLE strength.-PROGRESSING  Pt will gain at least 10 degrees ROM olivier shoulder flex and abd.- MET  Pt will have ROM olivier  olivier knee flex 100 degrees, olivier hip flex 80 degrees.- MET     Long Term Goals: 5 weeks   Pt's Tinetti Balance score will be at least 20/28.  Pt will have at least 4-/5 olivier shoulder flex and abd.  Pt will have at least 4/5 BLE strength.  Pt will have ROM olivier shoulder flex and abd at least 120 degrees. MET  Pt will have ROM olivier calves 10 degrees, olivier knee flex 110 degrees, olivier hip flex 100 degrees.    PLAN     Plan of care Certification: 3/20/2023 to 4/28/2023.     Outpatient Physical Therapy 2 times weekly for 5 weeks to include the following interventions: Electrical Stimulation  , Manual Therapy, Moist Heat/ Ice, Patient Education, Therapeutic Activities, Therapeutic Exercise, and Ultrasound.    Gabbie Weathers, PT

## 2023-04-05 ENCOUNTER — CLINICAL SUPPORT (OUTPATIENT)
Dept: REHABILITATION | Facility: HOSPITAL | Age: 78
End: 2023-04-05
Payer: COMMERCIAL
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
## 2023-04-05 PROCEDURE — 97530 THERAPEUTIC ACTIVITIES: CPT | Mod: CQ

## 2023-04-05 PROCEDURE — 97110 THERAPEUTIC EXERCISES: CPT | Mod: CQ

## 2023-04-05 NOTE — PROGRESS NOTES
"[facility]   Physical Therapy Treatment Note     Name: Rianna Devi  Clinic Number: [id number]    Therapy Diagnosis:   Encounter Diagnosis   Name Primary?    Fall, sequela Yes         Physician: Khurram Mcdowell MD    Visit Date: 4/5/2023    Physician Orders: PT Eval and Treat, Balance training  Medical Diagnosis from Referral: s/p Fall  Evaluation Date: 3/20/2023  Authorization Period Expiration: 4/28/2023     Visit # / Visits authorized: 6/11    Time In: 1315  Time Out: 1403  Total Appointment Time (timed & untimed codes): 48 minutes (8 minutes not billed for [facility])     Precautions: Fall and RA, HTN       PTA Visit #: 1/5       SUBJECTIVE     Pt reports: Patient stated that her arms are feeling better, but she stated that they are still sore. Patient also stated that she had a burning pain in her back, and that her legs feel weak.   She was compliant with home exercise program.  Response to previous treatment: Patient had no adverse effects.   Functional change: Too early in POC    Pain: 6/10  Location: low back, and legs    OBJECTIVE       Treatment     Rianna received the treatments listed below:      therapeutic exercises to develop strength and ROM for 30 minutes including:     Nustep , Level 3.0 5 minutes    Seated quad stretch 2 x 30" (not today)   Seated HS stretch BLE 2 x 30"    Lower trunk rotations  3 x 5" (not today)   Hip ADD squeezes 15x 3" hold (not today)   Hip ABD with red TB 10x  (not today)   Pulleys flex/abd  2 min each with short hold at end ROM (not today)   *Stretch: glutes 1x30" each  (not today)   *Standing: MS, PF, marching 1x10 each   BUE ex's: shldr flex 1#, shld abd 1# 1x10 each   BUE ex's: Empty Can 12 reps         manual therapy techniques: Trigger point release about left hip. Foam rolling olivier quads, olivier glutes, low back, olivier HS: for 0 minutes (Not today)    therapeutic activities to improve functional performance for balance 10 minutes, including:  Tandem " "amb fwd/bwd 6 ft x 2 reps, braiding R/L 6 ft x 1 rep, toe taps to 8" stool x 10 reps.     direct contact modalities after being cleared for contraindications: Ultrasound:  Rianna received ultrasound to manage pain and inflammation at 100 % duty cycle applied to the Left Neck/Upper trap at an intensity of  number 1.5 W/cm2  for a duration of 0 minutes. Patient tolerated treatment well without adverse effects. Therapist was in attendance throughout intervention. (Not today)    supervised modalities after being cleared for contradictions: Biphasic Electrical Stimulation:  Rianna received Biphasic Electrical Stimulation for pain control applied to the bilateral piriformis and quadratus lumborum. Pt received stimulation on burst mode at 2pps for 0 minutes. Rianna tolderated treatment well without any adverse effects.  (Not today)    hot pack for 8 minutes to bilateral low back.     cold pack for 0 minutes to NA.        Patient Education and Home Exercises     Home Exercises Provided and Patient Education Provided     Written Home Exercises Provided: Patient instructed to cont prior HEP. Exercises were reviewed and Rianna was able to demonstrate them prior to the end of the session.  Rianna demonstrated good  understanding of the education provided. See EMR under Patient Instructions for exercises provided during therapy sessions    ASSESSMENT   Patient was provided with visual and verbal cues for proper form and hold times of exercises and stretches. Patient required occasional rest breaks due to fatigue and pain. After completion of treatment patient stated that her back felt better.     Rianna Is progressing well towards her goals.   Pt prognosis is Good.     Pt will continue to benefit from skilled outpatient physical therapy to address the deficits listed in the problem list box on initial evaluation, provide pt/family education and to maximize pt's level of independence in the home and community environment. "     Pt's spiritual, cultural and educational needs considered and pt agreeable to plan of care and goals.     Anticipated Barriers for therapy: none        Goals:  Short Term Goals: 3 weeks   Pt will be indep with HEP.  Pt's Tinetti Balance score will be at least 17/28.  Pt will have at least 3+/5 olivier shoulder flex and abd.- MET  Pt will have at least 4-/5 BLE strength.-PROGRESSING  Pt will gain at least 10 degrees ROM olivier shoulder flex and abd.- MET  Pt will have ROM olivier  olivier knee flex 100 degrees, olivier hip flex 80 degrees.- MET     Long Term Goals: 5 weeks   Pt's Tinetti Balance score will be at least 20/28.  Pt will have at least 4-/5 olivier shoulder flex and abd.  Pt will have at least 4/5 BLE strength.  Pt will have ROM olivier shoulder flex and abd at least 120 degrees. MET  Pt will have ROM olivier calves 10 degrees, olivier knee flex 110 degrees, olivier hip flex 100 degrees.    PLAN     Plan of care Certification: 3/20/2023 to 4/28/2023.     Outpatient Physical Therapy 2 times weekly for 5 weeks to include the following interventions: Electrical Stimulation  , Manual Therapy, Moist Heat/ Ice, Patient Education, Therapeutic Activities, Therapeutic Exercise, and Ultrasound.    Swapna Tovar, PTA

## 2023-04-06 ENCOUNTER — CLINICAL SUPPORT (OUTPATIENT)
Dept: REHABILITATION | Facility: HOSPITAL | Age: 78
End: 2023-04-06
Payer: COMMERCIAL
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
## 2023-04-06 PROCEDURE — 97530 THERAPEUTIC ACTIVITIES: CPT | Mod: CQ

## 2023-04-06 PROCEDURE — 97110 THERAPEUTIC EXERCISES: CPT | Mod: CQ

## 2023-04-06 NOTE — PROGRESS NOTES
"[facility]   Physical Therapy Treatment Note     Name: Rianna Devi  Clinic Number: [id number]    Therapy Diagnosis:   Encounter Diagnosis   Name Primary?    Fall, sequela Yes         Physician: Khurrma Mcdowell MD    Visit Date: 4/6/2023    Physician Orders: PT Eval and Treat, Balance training  Medical Diagnosis from Referral: s/p Fall  Evaluation Date: 3/20/2023  Authorization Period Expiration: 4/28/2023     Visit # / Visits authorized: 7/11    Time In: 1314  Time Out: 1405  Total Appointment Time (timed & untimed codes): 51 minutes (8 minutes not billed for [facility]HP)     Precautions: Fall and RA, HTN       PTA Visit #: 2/5       SUBJECTIVE     Pt reports: Patient stated that she "burns" all over. Patient stated that she is going to see her doctor tomorrow.   She was compliant with home exercise program.  Response to previous treatment: Patient had no adverse effects.   Functional change: Too early in POC    Pain: 6/10  Location: low back, and legs    OBJECTIVE       Treatment     Rianna received the treatments listed below:      therapeutic exercises to develop strength and ROM for 33 minutes including:     Nustep , Level 2.0 7 minutes    Seated quad stretch 2 x 30" (not today)   Seated HS stretch BLE 2 x 30"    Lower trunk rotations  3 x 5" (not today)   Hip ADD squeezes 15x 3" hold (not today)   Hip ABD with red TB 10x  (not today)   Pulleys flex/abd  2 min each with short hold at end ROM (not today)   *Stretch: glutes 1x30" each  (not today)   *Standing: MS, PF, marching 15x each   BUE ex's: shldr flex 1#, shld abd 1# 15x each   BUE ex's: Empty Can 15 reps         manual therapy techniques: Trigger point release about left hip. Foam rolling olivier quads, olivier glutes, low back, olivier HS: for 0 minutes (Not today)    therapeutic activities to improve functional performance for balance 10 minutes, including:  Tandem amb fwd/bwd 6 ft x 2 reps, braiding R/L 6 ft x 1 rep, toe taps to 8" stool x 10 " "reps.     direct contact modalities after being cleared for contraindications: Ultrasound:  Rianna received ultrasound to manage pain and inflammation at 100 % duty cycle applied to the Left Neck/Upper trap at an intensity of  number 1.5 W/cm2  for a duration of 0 minutes. Patient tolerated treatment well without adverse effects. Therapist was in attendance throughout intervention. (Not today)    supervised modalities after being cleared for contradictions: Biphasic Electrical Stimulation:  Rianna received Biphasic Electrical Stimulation for pain control applied to the bilateral piriformis and quadratus lumborum. Pt received stimulation on burst mode at 2pps for 0 minutes. Rianna tolderated treatment well without any adverse effects.  (Not today)    hot pack for 8 minutes to bilateral back.     cold pack for 0 minutes to NA.        Patient Education and Home Exercises     Home Exercises Provided and Patient Education Provided     Written Home Exercises Provided: Patient instructed to cont prior HEP. Exercises were reviewed and Rianna was able to demonstrate them prior to the end of the session.  Rianna demonstrated good  understanding of the education provided. See EMR under Patient Instructions for exercises provided during therapy sessions    ASSESSMENT   Patient was provided with visual and verbal cues for proper form and hold times of therapy task. Patient required occasional rest breaks due to fatigue and pain. After completion of treatment patient stated that her back felt "pretty good."    Rianna Is progressing well towards her goals.   Pt prognosis is Good.     Pt will continue to benefit from skilled outpatient physical therapy to address the deficits listed in the problem list box on initial evaluation, provide pt/family education and to maximize pt's level of independence in the home and community environment.     Pt's spiritual, cultural and educational needs considered and pt agreeable to plan of care " and goals.     Anticipated Barriers for therapy: none        Goals:  Short Term Goals: 3 weeks   Pt will be indep with HEP.  Pt's Tinetti Balance score will be at least 17/28.  Pt will have at least 3+/5 olivier shoulder flex and abd.- MET  Pt will have at least 4-/5 BLE strength.-PROGRESSING  Pt will gain at least 10 degrees ROM olivier shoulder flex and abd.- MET  Pt will have ROM loivier  olivier knee flex 100 degrees, olivier hip flex 80 degrees.- MET     Long Term Goals: 5 weeks   Pt's Tinetti Balance score will be at least 20/28.  Pt will have at least 4-/5 olivier shoulder flex and abd.  Pt will have at least 4/5 BLE strength.  Pt will have ROM olivier shoulder flex and abd at least 120 degrees. MET  Pt will have ROM olivier calves 10 degrees, olivier knee flex 110 degrees, olivier hip flex 100 degrees.    PLAN     Plan of care Certification: 3/20/2023 to 4/28/2023.     Outpatient Physical Therapy 2 times weekly for 5 weeks to include the following interventions: Electrical Stimulation  , Manual Therapy, Moist Heat/ Ice, Patient Education, Therapeutic Activities, Therapeutic Exercise, and Ultrasound.    Swapna Tovar, PTA

## 2023-04-10 ENCOUNTER — DOCUMENTATION ONLY (OUTPATIENT)
Dept: REHABILITATION | Facility: HOSPITAL | Age: 78
End: 2023-04-10
Payer: COMMERCIAL

## 2023-04-12 ENCOUNTER — CLINICAL SUPPORT (OUTPATIENT)
Dept: REHABILITATION | Facility: HOSPITAL | Age: 78
End: 2023-04-12
Payer: COMMERCIAL
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
## 2023-04-12 PROCEDURE — 97530 THERAPEUTIC ACTIVITIES: CPT | Mod: CQ

## 2023-04-12 PROCEDURE — 97110 THERAPEUTIC EXERCISES: CPT | Mod: CQ

## 2023-04-12 PROCEDURE — 97140 MANUAL THERAPY 1/> REGIONS: CPT | Mod: CQ

## 2023-04-12 NOTE — PROGRESS NOTES
"[facility]   Physical Therapy Treatment Note     Name: Rianna Devi  Clinic Number: [id number]    Therapy Diagnosis:   Encounter Diagnosis   Name Primary?    Fall, sequela Yes         Physician: Khurram Mcdowell MD    Visit Date: 4/12/2023    Physician Orders: PT Eval and Treat, Balance training  Medical Diagnosis from Referral: s/p Fall  Evaluation Date: 3/20/2023  Authorization Period Expiration: 4/28/2023     Visit # / Visits authorized: 8/11    Time In: 1318  Time Out: 1402  Total Appointment Time (timed & untimed codes): 44 minutes     Precautions: Fall and RA, HTN       PTA Visit #: 3/5       SUBJECTIVE     Pt reports: Patient stated that she is sore in her arms and legs. Patient stated that her doctor gave her a muscle relaxer.    She was compliant with home exercise program.  Response to previous treatment: Patient had no adverse effects.   Functional change: Patient now walking on quad cane.     Pain: 5/10  Location: BUE, BLE     OBJECTIVE       Treatment     Rianna received the treatments listed below:      therapeutic exercises to develop strength and ROM for 26 minutes including:     Nustep , Level 2.0 7 minutes    Seated quad stretch 2 x 30" (not today)   Seated HS stretch BLE 2 x 30"    Lower trunk rotations  3 x 5" (not today)   Hip ADD squeezes 15x 3" hold (not today)   Hip ABD with red TB 10x  (not today)   Pulleys flex/abd  2 min each with short hold at end ROM (not today)   *Stretch: glutes 1x30" each  (not today)   *Standing: MS, PF, marching 15x each   BUE ex's: shldr flex 1#, shld abd 1# 15x each   BUE ex's: Empty Can 15 reps         manual therapy techniques: Foam rolling olivier quads, olivier glutes, low back, olivier HS, olivier calves: for 10 minutes    therapeutic activities to improve functional performance for balance 8 minutes, including:  Tandem amb fwd/bwd 6 ft x 2 reps, braiding R/L 6 ft x 2 rep, toe taps to 8" stool x 15 reps.     direct contact modalities after being " "cleared for contraindications: Ultrasound:  Rianna received ultrasound to manage pain and inflammation at 100 % duty cycle applied to the Left Neck/Upper trap at an intensity of  number 1.5 W/cm2  for a duration of 0 minutes. Patient tolerated treatment well without adverse effects. Therapist was in attendance throughout intervention. (Not today)    supervised modalities after being cleared for contradictions: Biphasic Electrical Stimulation:  Rianna received Biphasic Electrical Stimulation for pain control applied to the bilateral piriformis and quadratus lumborum. Pt received stimulation on burst mode at 2pps for 0 minutes. Rianna tolderated treatment well without any adverse effects.  (Not today)    hot pack for 0 minutes to bilateral back. NA     cold pack for 0 minutes to NA.        Patient Education and Home Exercises     Home Exercises Provided and Patient Education Provided     Written Home Exercises Provided: Patient instructed to cont prior HEP. Exercises were reviewed and Rianna was able to demonstrate them prior to the end of the session.  Rianna demonstrated good  understanding of the education provided. See EMR under Patient Instructions for exercises provided during therapy sessions    ASSESSMENT   Patient was provided with visual and verbal cues for proper form and hold times of therapy task. Patient required occasional rest breaks due to fatigue and pain. Patient stated that she feels "pretty good," but she stated that she had no change in pain after completion of treatment.     Rianna Is progressing well towards her goals.   Pt prognosis is Good.     Pt will continue to benefit from skilled outpatient physical therapy to address the deficits listed in the problem list box on initial evaluation, provide pt/family education and to maximize pt's level of independence in the home and community environment.     Pt's spiritual, cultural and educational needs considered and pt agreeable to plan of care " and goals.     Anticipated Barriers for therapy: none        Goals:  Short Term Goals: 3 weeks   Pt will be indep with HEP.  Pt's Tinetti Balance score will be at least 17/28.  Pt will have at least 3+/5 olivier shoulder flex and abd.- MET  Pt will have at least 4-/5 BLE strength.-PROGRESSING  Pt will gain at least 10 degrees ROM olivier shoulder flex and abd.- MET  Pt will have ROM olivier  olivier knee flex 100 degrees, olivier hip flex 80 degrees.- MET     Long Term Goals: 5 weeks   Pt's Tinetti Balance score will be at least 20/28.  Pt will have at least 4-/5 olivier shoulder flex and abd.  Pt will have at least 4/5 BLE strength.  Pt will have ROM olivier shoulder flex and abd at least 120 degrees. MET  Pt will have ROM olivier calves 10 degrees, olivier knee flex 110 degrees, olivier hip flex 100 degrees.    PLAN     Plan of care Certification: 3/20/2023 to 4/28/2023.     Outpatient Physical Therapy 2 times weekly for 5 weeks to include the following interventions: Electrical Stimulation  , Manual Therapy, Moist Heat/ Ice, Patient Education, Therapeutic Activities, Therapeutic Exercise, and Ultrasound.    Swapna Tovar, PTA

## 2023-04-17 ENCOUNTER — CLINICAL SUPPORT (OUTPATIENT)
Dept: REHABILITATION | Facility: HOSPITAL | Age: 78
End: 2023-04-17
Payer: COMMERCIAL
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
## 2023-04-17 PROCEDURE — 97140 MANUAL THERAPY 1/> REGIONS: CPT | Mod: CQ

## 2023-04-17 PROCEDURE — 97110 THERAPEUTIC EXERCISES: CPT | Mod: CQ

## 2023-04-17 PROCEDURE — 97530 THERAPEUTIC ACTIVITIES: CPT | Mod: CQ

## 2023-04-17 NOTE — PROGRESS NOTES
"[facility]   Physical Therapy Treatment Note     Name: Rianna Devi  Clinic Number: [id number]    Therapy Diagnosis:   Encounter Diagnosis   Name Primary?    Fall, sequela Yes         Physician: Khurram Mcdowell MD    Visit Date: 4/17/2023    Physician Orders: PT Eval and Treat, Balance training  Medical Diagnosis from Referral: s/p Fall  Evaluation Date: 3/20/2023  Authorization Period Expiration: 4/28/2023     Visit # / Visits authorized: 9/11    Time In: 1313  Time Out: 1359  Total Appointment Time (timed & untimed codes): 46 minutes     Precautions: Fall and RA, HTN       PTA Visit #: 4/5       SUBJECTIVE     Pt reports: Patient stated that she had pain in her arms and legs.   She was compliant with home exercise program.  Response to previous treatment: Patient had no adverse effects.   Functional change: Patient now walking on quad cane.     Pain: 3/10, 4/10  Location: BUE, BLE     OBJECTIVE       Treatment     Rianna received the treatments listed below:      therapeutic exercises to develop strength and ROM for 28 minutes including:     Nustep , Level 2.0 8 minutes    Seated quad stretch 2 x 30" (not today)   Seated HS stretch BLE 2 x 30"    Lower trunk rotations  3 x 5" (not today)   Hip ADD squeezes 15x 3" hold (not today)   Hip ABD with red TB 10x  (not today)   Pulleys flex/abd  2 min each with short hold at end ROM (not today)   *Stretch: glutes 1x30" each  (not today)   *Standing: MS, PF, marching 15x each   BUE ex's: shldr flex 1#, shld abd 1# 15x each   BUE ex's: Empty Can 15 reps   BUE: bicep curls 2# 10x     manual therapy techniques: Foam rolling olivier quads, olivier glutes, low back, olivier HS, olivier calves, olivier IT band: for 10 minutes    therapeutic activities to improve functional performance for balance 8 minutes, including:  Tandem amb fwd/bwd 6 ft x 2 reps, braiding R/L 6 ft x 2 rep, toe taps to 8" stool x 15 reps.     direct contact modalities after being cleared for " "contraindications: Ultrasound:  Rianna received ultrasound to manage pain and inflammation at 100 % duty cycle applied to the Left Neck/Upper trap at an intensity of  number 1.5 W/cm2  for a duration of 0 minutes. Patient tolerated treatment well without adverse effects. Therapist was in attendance throughout intervention. (Not today)    supervised modalities after being cleared for contradictions: Biphasic Electrical Stimulation:  Rianna received Biphasic Electrical Stimulation for pain control applied to the bilateral piriformis and quadratus lumborum. Pt received stimulation on burst mode at 2pps for 0 minutes. Rianna tolderated treatment well without any adverse effects.  (Not today)    hot pack for 0 minutes to bilateral back. NA     cold pack for 0 minutes to NA.        Patient Education and Home Exercises     Home Exercises Provided and Patient Education Provided     Written Home Exercises Provided: Patient instructed to cont prior HEP. Exercises were reviewed and Rianna was able to demonstrate them prior to the end of the session.  Rianna demonstrated good  understanding of the education provided. See EMR under Patient Instructions for exercises provided during therapy sessions    ASSESSMENT   Patient required a rest break while on nustep due to reports of a cramp in her R HS, she stated that it improved after a short time. Patient required occasional rest breaks due to fatigue and pain. Patient stated that she feels "pretty good" after completion of treatment.     Rianna Is progressing well towards her goals.   Pt prognosis is Good.     Pt will continue to benefit from skilled outpatient physical therapy to address the deficits listed in the problem list box on initial evaluation, provide pt/family education and to maximize pt's level of independence in the home and community environment.     Pt's spiritual, cultural and educational needs considered and pt agreeable to plan of care and goals.   "   Anticipated Barriers for therapy: none        Goals:  Short Term Goals: 3 weeks   Pt will be indep with HEP.  Pt's Tinetti Balance score will be at least 17/28.  Pt will have at least 3+/5 olivier shoulder flex and abd.- MET  Pt will have at least 4-/5 BLE strength.-PROGRESSING  Pt will gain at least 10 degrees ROM olivier shoulder flex and abd.- MET  Pt will have ROM olivier  olivier knee flex 100 degrees, olivier hip flex 80 degrees.- MET     Long Term Goals: 5 weeks   Pt's Tinetti Balance score will be at least 20/28.  Pt will have at least 4-/5 olivier shoulder flex and abd.  Pt will have at least 4/5 BLE strength.  Pt will have ROM olivier shoulder flex and abd at least 120 degrees. MET  Pt will have ROM olivier calves 10 degrees, olivier knee flex 110 degrees, olivier hip flex 100 degrees.    PLAN     Plan of care Certification: 3/20/2023 to 4/28/2023.     Outpatient Physical Therapy 2 times weekly for 5 weeks to include the following interventions: Electrical Stimulation  , Manual Therapy, Moist Heat/ Ice, Patient Education, Therapeutic Activities, Therapeutic Exercise, and Ultrasound.    Swapna Tovar, PTA

## 2023-04-19 ENCOUNTER — CLINICAL SUPPORT (OUTPATIENT)
Dept: REHABILITATION | Facility: HOSPITAL | Age: 78
End: 2023-04-19
Payer: COMMERCIAL
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
## 2023-04-19 PROCEDURE — 97110 THERAPEUTIC EXERCISES: CPT

## 2023-04-19 NOTE — PLAN OF CARE
Reasons for Recertification of Therapy: Pt would benefit from cont'd PT to further improve balance to decrease falls risk, cont with strengthening BUE/BLE and to progress her from QC to SC. She has not, yet, met all her goals, but has improved from using a RW to now a NBQC.       Plan     Updated Certification Period: 4/19/2023 to 5/19/2023  Recommended Treatment Plan: 2 times per week for 3 weeks: Electrical Stimulation  , Manual Therapy, Moist Heat/ Ice, Patient Education, Therapeutic Activities, Therapeutic Exercise, and Ultrasound    Other Recommendations: Extend 3 more weeks    Gabbie Weathers, PT  4/19/2023      I CERTIFY THE NEED FOR THESE SERVICES FURNISHED UNDER THIS PLAN OF TREATMENT AND WHILE UNDER MY CARE.    Physician's comments:      Physician's Signature: ___________________________________________________

## 2023-04-19 NOTE — PROGRESS NOTES
"[facility]   Physical Therapy Treatment Note     Name: Rianna Devi  Clinic Number: [id number]    Therapy Diagnosis:   Encounter Diagnosis   Name Primary?    Fall, sequela Yes         Physician: Khurram Mcdowell MD    Visit Date: 4/19/2023    Physician Orders: PT Eval and Treat, Balance training  Medical Diagnosis from Referral: s/p Fall  Evaluation Date: 3/20/2023  Authorization Period Expiration: 4/28/2023     Visit # / Visits authorized: 10/11 (missed visit on 4/10/23)    Time In: 1330  Time Out: 1404  Total Appointment Time (timed & untimed codes): 34 minutes     Precautions: Fall and RA, HTN       PTA Visit #: 0/5       SUBJECTIVE     Pt reports: Patient stated that she had pain in her arms and legs.   She was compliant with home exercise program.  Response to previous treatment: Patient had no adverse effects.   Functional change: Patient now walking on quad cane.     Pain: 3/10, 5/10  Location: LUE, BLE -hips to ankles    OBJECTIVE       Treatment     Rianna received the treatments listed below:      therapeutic exercises to develop strength and ROM for 29 minutes including:     Nustep , Level 2.0 6.5 minutes    Seated quad stretch 2 x 30" (not today)   Seated HS stretch BLE 2 x 30"    Lower trunk rotations  3 x 5" (not today)   Hip ADD squeezes 15x 3" hold (not today)   Hip ABD with red TB 10x  (not today)   Pulleys flex/abd  2 min each with short hold at end ROM (not today)   *Stretch: glutes 2x30" each  (seated)   *Standing: MS, PF, marching 15x each   BUE ex's: shldr flex 1#, shld abd 1# 15x each   BUE ex's: Empty Can 20 reps   BUE: bicep curls 2# 15x     TINETTI BALANCE ASSESSMENT TOOL    BALANCE SECTION  Patient is seated in hard, armless chair.    1.Sitting Balance: Steady; safe = 1  2.Rises from chair: Able, without using arms = 2  3.Attempts to arise: Able to arise, 1 attempt = 2  4.Immediate standing Balance (first 5 seconds): Steady but uses walker or other support = " "1  5.Standing balance: Steady but wide stance (medal heel>4" apart) & uses cane or other support = 1  6.Nudged: Begins to fall = 0  7.Eyes closed: Steady = 1  8.Turning 360 degrees: Continuous = 1 and Steady = 1  9.Sitting Down: Uses arms or not a smooth motion = 1    Balance Score: 10/16    GAIT SECTION  Patient stands with therapist, walks across room (+/- aids), first at usual pace, then at rapid pace.    10.Initiation of Gait (Immediately after told to go.): No hesitancy = 1  11.Step length and height: Step through R=1 and Step through L=1  12.Foot Clearance: L foot clears floor=1 and R foot clears floor=1  13.Step symmetry: Right & Left step length not equal (estimate) = 0  14.Step continuity: Steps appear continuous = 1  15.Path: Mild/moderate deviation or uses walking aid = 1  16.Trunk: Marked sway or uses walking aid = 0  17.Walking Time: Heels apart = 0    Gait Score: 7/12  Total Score (Balance + Gait Score): 17/28 using NBQC     manual therapy techniques: Foam rolling olivier quads, olivier glutes, low back, olivier HS, olivier calves, olivier IT band: for 0 minutes (NOT TODAY)    therapeutic activities to improve functional performance for balance 5 minutes, including:  . --TINETTI TEST only    direct contact modalities after being cleared for contraindications: Ultrasound:  Rianna received ultrasound to manage pain and inflammation at 100 % duty cycle applied to the Left Neck/Upper trap at an intensity of  number 1.5 W/cm2  for a duration of 0 minutes. Patient tolerated treatment well without adverse effects. Therapist was in attendance throughout intervention. (Not today)    supervised modalities after being cleared for contradictions: Biphasic Electrical Stimulation:  Rianna received Biphasic Electrical Stimulation for pain control applied to the bilateral piriformis and quadratus lumborum. Pt received stimulation on burst mode at 2pps for 0 minutes. Rianna tolderated treatment well without any adverse effects.  (Not " today)    hot pack for 0 minutes to bilateral back. NA     cold pack for 0 minutes to NA.        Patient Education and Home Exercises     Home Exercises Provided and Patient Education Provided     Written Home Exercises Provided: Patient instructed to cont prior HEP. Exercises were reviewed and Rianna was able to demonstrate them prior to the end of the session.  Rianna demonstrated good  understanding of the education provided. See EMR under Patient Instructions for exercises provided during therapy sessions    ASSESSMENT   Patient has used up her visits, but has not met all her goals. Pt would benefit from cont'd PT to further improve balance to decrease falls risk, cont with strengthening BUE/BLE and to progress her from QC to SC.    Rianna Is progressing well towards her goals.   Pt prognosis is Good.     Pt will continue to benefit from skilled outpatient physical therapy to address the deficits listed in the problem list box on initial evaluation, provide pt/family education and to maximize pt's level of independence in the home and community environment.     Pt's spiritual, cultural and educational needs considered and pt agreeable to plan of care and goals.     Anticipated Barriers for therapy: none        Goals:  Short Term Goals: 3 weeks   Pt will be indep with HEP.-met  Pt's Tinetti Balance score will be at least 17/28.-MET  Pt will have at least 3+/5 olivier shoulder flex and abd.- MET  Pt will have at least 4-/5 BLE strength.-PROGRESSING  Pt will gain at least 10 degrees ROM olivier shoulder flex and abd.- MET  Pt will have ROM olivier  olivier knee flex 100 degrees, olivier hip flex 80 degrees.- MET     Long Term Goals: 5 weeks   Pt's Tinetti Balance score will be at least 20/28.  Pt will have at least 4-/5 olivier shoulder flex and abd.  Pt will have at least 4/5 BLE strength.  Pt will have ROM olivier shoulder flex and abd at least 120 degrees. MET  Pt will have ROM olivier calves 10 degrees, olivier knee flex 110 degrees, olivier hip flex  100 degrees.    PLAN     Plan of care Certification: 3/20/2023 to 4/28/2023.     Outpatient Physical Therapy 2 times weekly for 5 weeks to include the following interventions: Electrical Stimulation  , Manual Therapy, Moist Heat/ Ice, Patient Education, Therapeutic Activities, Therapeutic Exercise, and Ultrasound.    Gabbie Weathers PT

## 2023-05-02 ENCOUNTER — CLINICAL SUPPORT (OUTPATIENT)
Dept: REHABILITATION | Facility: HOSPITAL | Age: 78
End: 2023-05-02
Payer: COMMERCIAL
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
## 2023-05-02 PROCEDURE — 97110 THERAPEUTIC EXERCISES: CPT

## 2023-05-02 PROCEDURE — 97140 MANUAL THERAPY 1/> REGIONS: CPT

## 2023-05-02 PROCEDURE — 97530 THERAPEUTIC ACTIVITIES: CPT

## 2023-05-02 NOTE — PROGRESS NOTES
"[facility]   Physical Therapy Treatment Note     Name: Rianna Devi  Clinic Number: [id number]    Therapy Diagnosis:   No diagnosis found.        Physician: Khurram Mcdowell MD    Visit Date: 5/2/2023    Physician Orders: PT Eval and Treat, Balance training  Medical Diagnosis from Referral: s/p Fall  Evaluation Date: 3/20/2023  Authorization Period Expiration: 5/19/2023     Visit # / Visits authorized: 11/17 (missed visit on 4/10/23)    Time In: 1106  Time Out: 1148  Total Appointment Time (timed & untimed codes): 42 minutes     Precautions: Fall and RA, HTN       PTA Visit #: 0/5       SUBJECTIVE     Pt reports: Patient stated that her"BUE are doing much better." States she has been referred by PCP to pain mgt who told her that her fall "flared up my RA." C/o pain olivier knees and olivier Achilles tendon. Pt states that she does not have any plans to return to Dr. Khurram Mcdowell in the next few months.  She was compliant with home exercise program.  Response to previous treatment: Patient had no adverse effects.   Functional change: Patient now walking on quad cane.     Pain: 5/10  Location: olivier knees and olivier Achilles tendon    OBJECTIVE        Manual muscle test: 5/2/23  Muscle Right     Left    Hip flexion  MMT strength: 4/5    MMT strength: 4/5      Hip extension       Hip abduction  MMT strength: 5/5    MMT strength: 5/5     Hip adduction                          3/5                          3/5   Ankle DF                           3+/5                           4-/5   Ankle PF                           3+/5                          4/5   Knee extension  MMT strength: 4+/5   MMT strength: 4+/5    Knee flexion  MMT strength: 4/5   MMT strength: 4/5       SHLDR FLEX                                  4/5                                       4/5  SHLDR ABD                                    4-/5                                      4/5         Range of motion:  Motion Right Left    Hip flexion   " "WFL   WFL   Hip extension       Hip abduction  WFL  WFL   Hip adduction       Internal rotation       External rotation       Knee extension   0   0   Knee flexion   130   120   Ankle DF   15   10     Treatment     Rianna received the treatments listed below:      therapeutic exercises to develop strength and ROM for 22 minutes including: MMT, ROM    Nustep , Level 2.0 8 minutes    Seated quad stretch 2 x 30" (not today)   Seated HS stretch BLE 2 x 30" (not today)   Lower trunk rotations  3 x 5" (not today)   Hip ADD squeezes 15x 3" hold (not today)   Hip ABD with red TB 10x  (not today)   Pulleys flex/abd  2 min each with short hold at end ROM (not today)   *Stretch: glutes 2x30" each  (seated)   *Standing: MS, PF, marching 15x each (not today)   BUE ex's: shldr flex 1#, shld abd 1# 15x each (not today)   BUE ex's: Empty Can 20 reps (not today)   BUE: bicep curls 2# 15x (not today)     TINETTI BALANCE ASSESSMENT TOOL: 5/2/23    BALANCE SECTION  Patient is seated in hard, armless chair.    1.Sitting Balance: Steady; safe = 1  2.Rises from chair: Able, uses arms to help = 1  3.Attempts to arise: Able to arise, 1 attempt = 2  4.Immediate standing Balance (first 5 seconds): Steady but uses walker or other support = 1  5.Standing balance: Steady but wide stance (medal heel>4" apart) & uses cane or other support = 1  6.Nudged: Begins to fall = 0  7.Eyes closed: Steady = 1  8.Turning 360 degrees: Continuous = 1 and Steady = 1  9.Sitting Down: Uses arms or not a smooth motion = 1    Balance Score: 9/16    GAIT SECTION  Patient stands with therapist, walks across room (+/- aids), first at usual pace, then at rapid pace.    10.Initiation of Gait (Immediately after told to go.): No hesitancy = 1  11.Step length and height: Step through R=1 and Step through L=1  12.Foot Clearance: L foot clears floor=1 and R foot clears floor=1  13.Step symmetry: Right & Left step length appear equal = 1  14.Step continuity: Steps appear " continuous = 1  15.Path: Mild/moderate deviation or uses walking aid = 1  16.Trunk: Marked sway or uses walking aid = 0  17.Walking Time: Heels apart = 0    Gait Score: 8/12  Total Score (Balance + Gait Score): 17/28 using NBQC     manual therapy techniques: Foam rolling olivier quads, olivier glutes, low back, olivier HS, olivier calves, olivier IT band: for 10 minutes     therapeutic activities to improve functional performance for balance 10 minutes, including:  . --TINETTI TEST only    direct contact modalities after being cleared for contraindications: Ultrasound:  Rianna received ultrasound to manage pain and inflammation at 100 % duty cycle applied to the Left Neck/Upper trap at an intensity of  number 1.5 W/cm2  for a duration of 0 minutes. Patient tolerated treatment well without adverse effects. Therapist was in attendance throughout intervention. (Not today)    supervised modalities after being cleared for contradictions: Biphasic Electrical Stimulation:  Rianna received Biphasic Electrical Stimulation for pain control applied to the bilateral piriformis and quadratus lumborum. Pt received stimulation on burst mode at 2pps for 0 minutes. Rianna tolderated treatment well without any adverse effects.  (Not today)    hot pack for 0 minutes to bilateral back. NA     cold pack for 0 minutes to NA.        Patient Education and Home Exercises     Home Exercises Provided and Patient Education Provided     Written Home Exercises Provided: Patient instructed to cont prior HEP. Exercises were reviewed and Rianna was able to demonstrate them prior to the end of the session.  Rianna demonstrated good  understanding of the education provided. See EMR under Patient Instructions for exercises provided during therapy sessions    ASSESSMENT   Patient has met her strength and ROM goals for BUE. She is now being extended 3 more weeks under orders from Jeanna Aiken NP of [facility] of to address pain and weakness of olivier  calves/Achilles tendons, olivier knee pain and to further decrease falls risk to low level as she continues to be at high falls risk.     Rianna Is progressing well towards her goals.   Pt prognosis is Good.     Pt will continue to benefit from skilled outpatient physical therapy to address the deficits listed in the problem list box on initial evaluation, provide pt/family education and to maximize pt's level of independence in the home and community environment.     Pt's spiritual, cultural and educational needs considered and pt agreeable to plan of care and goals.     Anticipated Barriers for therapy: none        Goals:  Short Term Goals: 3 weeks   Pt will be indep with HEP.-MET  Pt's Tinetti Balance score will be at least 17/28.-MET  Pt will have at least 3+/5 olivier shoulder flex and abd.- MET  Pt will have at least 4-/5 BLE strength.-MET EXCEPT FOR CALVES  Pt will gain at least 10 degrees ROM olivier shoulder flex and abd.- MET  Pt will have ROM olivier  olivier knee flex 100 degrees, olivier hip flex 80 degrees.- MET     Long Term Goals: 8 weeks   Pt's Tinetti Balance score will be at least 19/28.  Pt will have at least 4-/5 olivier shoulder flex and abd.-MET  Pt will have at least 4/5 BLE strength.  Pt will have ROM olivier shoulder flex and abd at least 120 degrees. MET  Pt will have ROM olivier calves 10 degrees, olivier knee flex 110 degrees, olivier hip flex 100 degrees.-MET    PLAN     Plan of care Certification: 5/2/2023 to 5/19/2023.     Outpatient Physical Therapy 2 times weekly for 3 weeks to include the following interventions: Electrical Stimulation  , Manual Therapy, Moist Heat/ Ice, Patient Education, Therapeutic Activities, Therapeutic Exercise, and Ultrasound.    Gabbie Weathers, PT

## 2023-05-04 ENCOUNTER — CLINICAL SUPPORT (OUTPATIENT)
Dept: REHABILITATION | Facility: HOSPITAL | Age: 78
End: 2023-05-04
Payer: COMMERCIAL
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
## 2023-05-04 PROCEDURE — 97530 THERAPEUTIC ACTIVITIES: CPT | Mod: CQ

## 2023-05-04 PROCEDURE — 97110 THERAPEUTIC EXERCISES: CPT | Mod: CQ

## 2023-05-04 NOTE — PROGRESS NOTES
"[facility]   Physical Therapy Treatment Note     Name: Rianna Devi  Clinic Number: [id number]    Therapy Diagnosis:   Encounter Diagnosis   Name Primary?    Fall, sequela Yes           Physician: Khurram Mcdowell MD    Visit Date: 5/4/2023    Physician Orders: PT Eval and Treat, Balance training  Medical Diagnosis from Referral: s/p Fall  Evaluation Date: 3/20/2023  Authorization Period Expiration: 5/19/2023     Visit # / Visits authorized: 12/17 (missed visit on 4/10/23)    Time In: 1318  Time Out: 1357  Total Appointment Time (timed & untimed codes): 39 minutes     Precautions: Fall and RA, HTN       PTA Visit #: 1/5       SUBJECTIVE     Pt reports: Patient stated that her arms are better.   She was compliant with home exercise program.  Response to previous treatment: Patient had no adverse effects.   Functional change: Patient now walking on quad cane.     Pain: 5/10  Location: olivier knees and calves     OBJECTIVE       Treatment     Rianna received the treatments listed below:      therapeutic exercises to develop strength and ROM for 29 minutes including:     Nustep , Level 2.0 8 minutes    Seated quad stretch 2 x 30" (not today)   Seated HS stretch BLE 2 x 30"   Lower trunk rotations  3 x 5" (not today)   Hip ADD squeezes 15x 3" hold (not today)   Hip ABD with red TB 10x  (not today)   Pulleys flex/abd  2 min each with short hold at end ROM (not today)   *Stretch: glutes seated  1 x 30" and 2 x 15"   *Standing: MS, PF, marching, ABD 15x each    BUE ex's: shldr flex 1#, shld abd 1# 15x each (not today)   BUE ex's: Empty Can 20 reps (not today)   BUE: bicep curls 2# 15x (not today)        manual therapy techniques: Foam rolling olivier quads, olivier glutes, low back, olivier HS, olivier calves, olivier IT band: for 0 minutes (Not today)    therapeutic activities to improve functional performance for balance 10 minutes, including: tandem walking 6 feet fwd/bwd x 2 trials, double braiding L and R 6 feet x " "2 trials, single leg stance 15" x 2 on BLE with finger tip support     direct contact modalities after being cleared for contraindications: Ultrasound:  Rianna received ultrasound to manage pain and inflammation at 100 % duty cycle applied to the Left Neck/Upper trap at an intensity of  number 1.5 W/cm2  for a duration of 0 minutes. Patient tolerated treatment well without adverse effects. Therapist was in attendance throughout intervention. (Not today)    supervised modalities after being cleared for contradictions: Biphasic Electrical Stimulation:  Rianna received Biphasic Electrical Stimulation for pain control applied to the bilateral piriformis and quadratus lumborum. Pt received stimulation on burst mode at 2pps for 0 minutes. Rianna tolderated treatment well without any adverse effects.  (Not today)    hot pack for 0 minutes to bilateral back. NA     cold pack for 0 minutes to NA.        Patient Education and Home Exercises     Home Exercises Provided and Patient Education Provided     Written Home Exercises Provided: Patient instructed to cont prior HEP. Exercises were reviewed and Rianna was able to demonstrate them prior to the end of the session.  Rianna demonstrated good  understanding of the education provided. See EMR under Patient Instructions for exercises provided during therapy sessions    ASSESSMENT   Patient required visual and verbal cues for proper form and hold times of exercises and stretches. Patient required occasional rest breaks due to fatigue throughout treatment. Patient stated, "I feel pretty good" after completion of treatment.     Rianna Is progressing well towards her goals.   Pt prognosis is Good.     Pt will continue to benefit from skilled outpatient physical therapy to address the deficits listed in the problem list box on initial evaluation, provide pt/family education and to maximize pt's level of independence in the home and community environment.     Pt's spiritual, cultural " and educational needs considered and pt agreeable to plan of care and goals.     Anticipated Barriers for therapy: none        Goals:  Short Term Goals: 3 weeks   Pt will be indep with HEP.-MET  Pt's Tinetti Balance score will be at least 17/28.-MET  Pt will have at least 3+/5 olivier shoulder flex and abd.- MET  Pt will have at least 4-/5 BLE strength.-MET EXCEPT FOR CALVES  Pt will gain at least 10 degrees ROM olivier shoulder flex and abd.- MET  Pt will have ROM olivier  olivier knee flex 100 degrees, olivier hip flex 80 degrees.- MET     Long Term Goals: 8 weeks   Pt's Tinetti Balance score will be at least 19/28.  Pt will have at least 4-/5 olivier shoulder flex and abd.-MET  Pt will have at least 4/5 BLE strength.  Pt will have ROM olivier shoulder flex and abd at least 120 degrees. MET  Pt will have ROM olivier calves 10 degrees, olivier knee flex 110 degrees, olivier hip flex 100 degrees.-MET    PLAN     Plan of care Certification: 5/2/2023 to 5/19/2023.     Outpatient Physical Therapy 2 times weekly for 3 weeks to include the following interventions: Electrical Stimulation  , Manual Therapy, Moist Heat/ Ice, Patient Education, Therapeutic Activities, Therapeutic Exercise, and Ultrasound.    Swapna Tovar, PTA

## 2023-05-09 ENCOUNTER — CLINICAL SUPPORT (OUTPATIENT)
Dept: REHABILITATION | Facility: HOSPITAL | Age: 78
End: 2023-05-09
Payer: COMMERCIAL
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
## 2023-05-09 PROCEDURE — 97530 THERAPEUTIC ACTIVITIES: CPT | Mod: CQ

## 2023-05-09 PROCEDURE — 97110 THERAPEUTIC EXERCISES: CPT | Mod: CQ

## 2023-05-09 NOTE — PROGRESS NOTES
"[facility]   Physical Therapy Treatment Note     Name: Rianna Devi  Clinic Number: [id number]    Therapy Diagnosis:   No diagnosis found.          Physician: Khurram Mcdowell MD    Visit Date: 5/9/2023    Physician Orders: PT Eval and Treat, Balance training  Medical Diagnosis from Referral: s/p Fall  Evaluation Date: 3/20/2023  Authorization Period Expiration: 5/19/2023     Visit # / Visits authorized: 13/17 (missed visit on 4/10/23)    Time In: 1312  Time Out: 1352  Total Appointment Time (timed & untimed codes): 40 minutes     Precautions: Fall and RA, HTN       PTA Visit #: 2/5       SUBJECTIVE     Pt reports: Patient stated, "I feel pretty good today." Patient stated that she was sore behind her knees. Patient stated that her R ankle was hurting at home, so she has a brace on today.  She was compliant with home exercise program.  Response to previous treatment: Patient had no adverse effects.   Functional change: Patient now walking on quad cane.     Pain: 4/10  Location: low back and R knee    OBJECTIVE       Treatment     Rianna received the treatments listed below:      therapeutic exercises to develop strength and ROM for 30 minutes including:     Nustep , Level 2.0 8 minutes    Seated quad stretch 2 x 30" (not today)   Seated HS stretch BLE 2 x 30"   Lower trunk rotations  3 x 5" (not today)   Hip ADD squeezes 15x 3" hold (not today)   Hip ABD with red TB 10x  (not today)   Pulleys flex/abd  2 min each with short hold at end ROM (not today)   *Stretch: glutes  2 x 30" (not today)   *Standing: MS, PF, marching, ABD 20x each    Step ups on 4 in step 10x on BLE   BUE ex's: Empty Can 20 reps (not today)   BUE: bicep curls 2# 15x (not today)        manual therapy techniques: Foam rolling olivier quads, olivier glutes, low back, olivier HS, olivier calves, olivier IT band: for 0 minutes (Not today)    therapeutic activities to improve functional performance for balance 10 minutes, including: tandem " "walking 6 feet fwd/bwd x 2 trials, double braiding L and R 6 feet x 2 trials, single leg stance 15" x 1 on RLE with finger tip support.    direct contact modalities after being cleared for contraindications: Ultrasound:  Rianna received ultrasound to manage pain and inflammation at 100 % duty cycle applied to the Left Neck/Upper trap at an intensity of  number 1.5 W/cm2  for a duration of 0 minutes. Patient tolerated treatment well without adverse effects. Therapist was in attendance throughout intervention. (Not today)    supervised modalities after being cleared for contradictions: Biphasic Electrical Stimulation:  Rianna received Biphasic Electrical Stimulation for pain control applied to the bilateral piriformis and quadratus lumborum. Pt received stimulation on burst mode at 2pps for 0 minutes. Rianna tolderated treatment well without any adverse effects.  (Not today)    hot pack for 0 minutes to bilateral back. NA     cold pack for 0 minutes to NA.        Patient Education and Home Exercises     Home Exercises Provided and Patient Education Provided     Patient Education: Proper ambulation with quad cane    Written Home Exercises Provided: Patient instructed to cont prior HEP. Exercises were reviewed and Rianna was able to demonstrate them prior to the end of the session.  Rianna demonstrated good  understanding of the education provided. See Media for exercises provided during therapy sessions    ASSESSMENT   Patient required visual and verbal cues for proper form and hold times of exercises and stretches. Patient required occasional rest breaks due to fatigue throughout treatment. Patient's R knee buckled while performing single leg stance, so activity stopped. Patient with no adverse effects to treatment.     Rianna Is progressing well towards her goals.   Pt prognosis is Good.     Pt will continue to benefit from skilled outpatient physical therapy to address the deficits listed in the problem list box on " initial evaluation, provide pt/family education and to maximize pt's level of independence in the home and community environment.     Pt's spiritual, cultural and educational needs considered and pt agreeable to plan of care and goals.     Anticipated Barriers for therapy: none        Goals:  Short Term Goals: 3 weeks   Pt will be indep with HEP.-MET  Pt's Tinetti Balance score will be at least 17/28.-MET  Pt will have at least 3+/5 olivier shoulder flex and abd.- MET  Pt will have at least 4-/5 BLE strength.-MET EXCEPT FOR CALVES  Pt will gain at least 10 degrees ROM olivier shoulder flex and abd.- MET  Pt will have ROM olivier  olivier knee flex 100 degrees, olivier hip flex 80 degrees.- MET     Long Term Goals: 8 weeks   Pt's Tinetti Balance score will be at least 19/28.  Pt will have at least 4-/5 olivier shoulder flex and abd.-MET  Pt will have at least 4/5 BLE strength.  Pt will have ROM olivier shoulder flex and abd at least 120 degrees. MET  Pt will have ROM olivier calves 10 degrees, olivier knee flex 110 degrees, olivier hip flex 100 degrees.-MET    PLAN     Plan of care Certification: 5/2/2023 to 5/19/2023.     Outpatient Physical Therapy 2 times weekly for 3 weeks to include the following interventions: Electrical Stimulation  , Manual Therapy, Moist Heat/ Ice, Patient Education, Therapeutic Activities, Therapeutic Exercise, and Ultrasound.    Swapna Tovar, PTA

## 2023-05-11 ENCOUNTER — CLINICAL SUPPORT (OUTPATIENT)
Dept: REHABILITATION | Facility: HOSPITAL | Age: 78
End: 2023-05-11
Payer: COMMERCIAL
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
## 2023-05-11 PROCEDURE — 97110 THERAPEUTIC EXERCISES: CPT | Mod: CQ

## 2023-05-11 PROCEDURE — 97530 THERAPEUTIC ACTIVITIES: CPT | Mod: CQ

## 2023-05-11 NOTE — PROGRESS NOTES
"[facility]   Physical Therapy Treatment Note     Name: Rianna Devi  Clinic Number: [id number]    Therapy Diagnosis:   Encounter Diagnosis   Name Primary?    Fall, sequela Yes             Physician: Khurram Mcdowell MD    Visit Date: 5/11/2023    Physician Orders: PT Eval and Treat, Balance training  Medical Diagnosis from Referral: s/p Fall  Evaluation Date: 3/20/2023  Authorization Period Expiration: 5/19/2023     Visit # / Visits authorized: 14/17 (missed visit on 4/10/23)    Time In: 1315  Time Out: 1355  Total Appointment Time (timed & untimed codes): 40 minutes     Precautions: Fall and RA, HTN       PTA Visit #: 3/5       SUBJECTIVE     Pt reports: Patient stated that she had pain in her R knee and ankle prior to treatment.   She was compliant with home exercise program.  Response to previous treatment: Patient had no adverse effects.   Functional change: Patient now walking on quad cane.     Pain: 3/10  Location: R knee and ankle     OBJECTIVE       Treatment     Rianna received the treatments listed below:      therapeutic exercises to develop strength and ROM for 30 minutes including:     Nustep , Level 2.0 9 minutes    Seated quad stretch 2 x 30" (not today)   Seated HS stretch BLE 2 x 30"   Lower trunk rotations  3 x 5" (not today)   Hip ADD squeezes 15x 3" hold (not today)   Hip ABD with red TB 10x  (not today)   Pulleys flex/abd  2 min each with short hold at end ROM (not today)   *Stretch: glutes  2 x 30" (not today)   *Standing: MS, PF, marching, ABD 20x each    Step ups on 4 in step 10x on BLE   BUE ex's: Empty Can 20 reps (not today)   BUE: bicep curls 2# 15x (not today)        manual therapy techniques: Foam rolling olivier quads, olivier glutes, low back, olivier HS, olivier calves, olivier IT band: for 0 minutes (Not today)    therapeutic activities to improve functional performance for balance 10 minutes, including: tandem walking 6 feet fwd/bwd x 2 trials, double braiding L and R 6 feet " "x 2 trials, single leg stance 20" x1 on LLE, standing on blue foam 20" x2 with WBOS and 20"x2 with NBOS    direct contact modalities after being cleared for contraindications: Ultrasound:  Rianna received ultrasound to manage pain and inflammation at 100 % duty cycle applied to the Left Neck/Upper trap at an intensity of  number 1.5 W/cm2  for a duration of 0 minutes. Patient tolerated treatment well without adverse effects. Therapist was in attendance throughout intervention. (Not today)    supervised modalities after being cleared for contradictions: Biphasic Electrical Stimulation:  Rianna received Biphasic Electrical Stimulation for pain control applied to the bilateral piriformis and quadratus lumborum. Pt received stimulation on burst mode at 2pps for 0 minutes. Rianna tolderated treatment well without any adverse effects.  (Not today)    hot pack for 0 minutes to bilateral back. NA     cold pack for 0 minutes to NA.        Patient Education and Home Exercises     Home Exercises Provided and Patient Education Provided     Written Home Exercises Provided: Patient instructed to cont prior HEP. Exercises were reviewed and Rianna was able to demonstrate them prior to the end of the session.  Rianna demonstrated good  understanding of the education provided. See Media for exercises provided during therapy sessions    ASSESSMENT   Patient required visual and verbal cues for proper form and hold times of exercises and stretches. Patient required occasional rest breaks due to fatigue throughout treatment. Patient's R knee was buckling while performing single leg stance, so activity stopped. Patient with no adverse effects to treatment.     Rianna Is progressing well towards her goals.   Pt prognosis is Good.     Pt will continue to benefit from skilled outpatient physical therapy to address the deficits listed in the problem list box on initial evaluation, provide pt/family education and to maximize pt's level of " independence in the home and community environment.     Pt's spiritual, cultural and educational needs considered and pt agreeable to plan of care and goals.     Anticipated Barriers for therapy: none        Goals:  Short Term Goals: 3 weeks   Pt will be indep with HEP.-MET  Pt's Tinetti Balance score will be at least 17/28.-MET  Pt will have at least 3+/5 olivier shoulder flex and abd.- MET  Pt will have at least 4-/5 BLE strength.-MET EXCEPT FOR CALVES  Pt will gain at least 10 degrees ROM olivier shoulder flex and abd.- MET  Pt will have ROM olivier  olivier knee flex 100 degrees, olivier hip flex 80 degrees.- MET     Long Term Goals: 8 weeks   Pt's Tinetti Balance score will be at least 19/28.  Pt will have at least 4-/5 olivier shoulder flex and abd.-MET  Pt will have at least 4/5 BLE strength.  Pt will have ROM olivier shoulder flex and abd at least 120 degrees. MET  Pt will have ROM olivier calves 10 degrees, olivier knee flex 110 degrees, olivier hip flex 100 degrees.-MET    PLAN     Plan of care Certification: 5/2/2023 to 5/19/2023.     Outpatient Physical Therapy 2 times weekly for 3 weeks to include the following interventions: Electrical Stimulation  , Manual Therapy, Moist Heat/ Ice, Patient Education, Therapeutic Activities, Therapeutic Exercise, and Ultrasound.    Swapna Tovar, PTA

## 2023-05-16 ENCOUNTER — CLINICAL SUPPORT (OUTPATIENT)
Dept: REHABILITATION | Facility: HOSPITAL | Age: 78
End: 2023-05-16
Payer: COMMERCIAL
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
## 2023-05-16 PROCEDURE — 97110 THERAPEUTIC EXERCISES: CPT

## 2023-05-16 PROCEDURE — 97014 ELECTRIC STIMULATION THERAPY: CPT

## 2023-05-16 PROCEDURE — 97140 MANUAL THERAPY 1/> REGIONS: CPT

## 2023-05-16 NOTE — PLAN OF CARE
PHYSICAL THERAPY DISCHARGE:    This patient was originally evaluated at our facility on: 3/20/2023         Pt attended PT for a total of 15/17 Visits receiving: Manual Therapy, Therex, Postural Education, Body Mechanics Training, Home Exercise Instruction     This patient's last visit occurred on: 5/16/2023      Short term goals were achieved: 1-6  Pt will be indep with HEP.-MET  Pt's Tinetti Balance score will be at least 17/28.-MET  Pt will have at least 3+/5 olivier shoulder flex and abd.- MET  Pt will have at least 4-/5 BLE strength.-MET   Pt will gain at least 10 degrees ROM olivier shoulder flex and abd.- MET  Pt will have ROM olivier  olivier knee flex 100 degrees, olivier hip flex 80 degrees.- MET    Long term goals were achieved: 2, 4, 5. Pt partially met 3, but did not meet 1.  Pt's Tinetti Balance score will be at least 19/28.-NOT MET. Tinett 17/28  Pt will have at least 4-/5 olivier shoulder flex and abd.-MET  Pt will have at least 4/5 BLE strength.-MET EXCEPT HS  Pt will have ROM olivier shoulder flex and abd at least 120 degrees. MET  Pt will have ROM olivier calves 10 degrees, olivier knee flex 110 degrees, olivier hip flex 100 degrees.-MET    Pt was DC'd from our care secondary to: pt was scheduled to be discharge 5/18/23, but requested to be dc'd today because she has an MRI schedule on 5/18/23. Pt cont's to c/o pain in right knee, ankle and low back.       Therapist: Gabbie Weathers, PT  5/16/2023

## 2023-05-16 NOTE — PROGRESS NOTES
"[facility]   Physical Therapy Treatment Note     Name: Rianna Devi  Clinic Number: [id number]    Therapy Diagnosis:   No diagnosis found.            Physician: Khurram Mcdowell MD    Visit Date: 5/16/2023    Physician Orders: PT Eval and Treat, Balance training  Medical Diagnosis from Referral: s/p Fall  Evaluation Date: 3/20/2023  Authorization Period Expiration: 5/19/2023     Visit # / Visits authorized: 15/17 (missed visit on 4/10/23)    Time In: 1309  Time Out: 1355  Total Appointment Time (timed & untimed codes): 46 minutes     Precautions: Fall and RA, HTN       PTA Visit #: 0/5       SUBJECTIVE     Pt reports: Patient requests to discharge from PT today because Thursday she has an MRI scheduled due to cont'd pain in right knee, ankle and low back from her original fall.  She was compliant with home exercise program.  Response to previous treatment: Patient had no adverse effects.   Functional change: Patient now walking on quad cane.     Pain: 6/10  Location: R knee and ankle, low back    OBJECTIVE   Manual muscle test   Muscle Right              5/16/23 Left             5/16/23   Hip flexion  MMT strength: 4/5  MMT strength: 4/5   Hip extension       Hip abduction  MMT strength: 4+/5  MMT strength: 4+/5   Hip adduction       Ankle DF                            4/5                           4/5     Ankle PF                            4/5                           4/5   Knee extension  MMT strength: 4+5  MMT strength: 4+/5   Knee flexion  MMT strength: 4-/5  MMT strength: 4/5     TINETTI BALANCE ASSESSMENT TOOL    BALANCE SECTION  Patient is seated in hard, armless chair.    1.Sitting Balance: Steady; safe = 1  2.Rises from chair: Able, uses arms to help = 1  3.Attempts to arise: Able to arise, 1 attempt = 2  4.Immediate standing Balance (first 5 seconds): Steady but uses walker or other support = 1  5.Standing balance: Steady but wide stance (medal heel>4" apart) & uses cane or " "other support = 1  6.Nudged: Begins to fall = 0  7.Eyes closed: Steady = 1  8.Turning 360 degrees: Discontinuous steps = 0 and Steady = 1  9.Sitting Down: Uses arms or not a smooth motion = 1    Balance Score: 9/16    GAIT SECTION  Patient stands with therapist, walks across room (+/- aids), first at usual pace, then at rapid pace.    10.Initiation of Gait (Immediately after told to go.): No hesitancy = 1  11.Step length and height: Step through R=1 and Step through L=1  12.Foot Clearance: L foot clears floor=1 and R foot clears floor=1  13.Step symmetry: Right & Left step length appear equal = 1  14.Step continuity: Steps appear continuous = 1  15.Path: Mild/moderate deviation or uses walking aid = 1  16.Trunk: Marked sway or uses walking aid = 0  17.Walking Time: Heels apart = 0    Gait Score: 8/12  Total Score (Balance + Gait Score): 17/28    Treatment     Rianna received the treatments listed below:      therapeutic exercises to develop strength and ROM for 19 minutes including: MMT BLE    Nustep , Level 2.0 10 minutes    Seated quad stretch 2 x 30" (not today)   Seated HS stretch BLE 2 x 30" (not today)   Lower trunk rotations  3 x 5" (not today)   Hip ADD squeezes 15x 3" hold (not today)   Hip ABD with red TB 10x  (not today)   Pulleys flex/abd  2 min each with short hold at end ROM (not today)   *Stretch: glutes  2 x 30" (not today)   *Standing: MS, PF, marching, ABD 20x each (not today)   Step ups on 4 in step 10x on BLE (not today)   BUE ex's: Empty Can 20 reps (not today)   BUE: bicep curls 2# 15x (not today)        manual therapy techniques: Foam rolling olivier quads, olivier glutes, low back, olivier HS, olivier calves, olivier IT band: for 10 minutes     therapeutic activities to improve functional performance for balance 5 minutes, including: reassessing Tinetti Balance test.    direct contact modalities after being cleared for contraindications: Ultrasound:  Rianna received ultrasound to manage pain and inflammation " at 100 % duty cycle applied to the Left Neck/Upper trap at an intensity of  number 1.5 W/cm2  for a duration of 0 minutes. Patient tolerated treatment well without adverse effects. Therapist was in attendance throughout intervention. (Not today)    supervised modalities after being cleared for contradictions: Rianna received IFC for pain/edema control applied to the Right knee for 12 minutes. Rianna tolderated treatment well without any adverse effects.     hot pack for 0 minutes to bilateral back. NA     cold pack for 12 minutes to right knee during IFC.        Patient Education and Home Exercises     Home Exercises Provided and Patient Education Provided -pt instructed to cont with HEP issued.    Written Home Exercises Provided: Patient instructed to cont prior HEP. Exercises were reviewed and Rianna was able to demonstrate them prior to the end of the session.  Rianna demonstrated good  understanding of the education provided. See Media for exercises provided during therapy sessions    ASSESSMENT   Patient is being dc'd this visit per her request. Right knee pain decreased from 6/10 to 4/10 post-treatment. Pt fully met STG 1-6 and LTG 2,4,5 and partially met LTG 3. Pt did not meet LTG 1 due to pt self-limiting due to pain c/o.    Rianna Is progressing well towards her goals.   Pt prognosis is Good.     Pt will continue to benefit from skilled outpatient physical therapy to address the deficits listed in the problem list box on initial evaluation, provide pt/family education and to maximize pt's level of independence in the home and community environment.     Pt's spiritual, cultural and educational needs considered and pt agreeable to plan of care and goals.     Anticipated Barriers for therapy: none        Goals:  Short Term Goals: 3 weeks   Pt will be indep with HEP.-MET  Pt's Tinetti Balance score will be at least 17/28.-MET  Pt will have at least 3+/5 olivier shoulder flex and abd.- MET  Pt will have at least  4-/5 BLE strength.-MET   Pt will gain at least 10 degrees ROM olivier shoulder flex and abd.- MET  Pt will have ROM olivier  olivier knee flex 100 degrees, olivier hip flex 80 degrees.- MET     Long Term Goals: 8 weeks   Pt's Tinetti Balance score will be at least 19/28.-NOT MET  Pt will have at least 4-/5 olivier shoulder flex and abd.-MET  Pt will have at least 4/5 BLE strength.-MET EXCEPT HS  Pt will have ROM olivier shoulder flex and abd at least 120 degrees. MET  Pt will have ROM olivier calves 10 degrees, olivier knee flex 110 degrees, olivier hip flex 100 degrees.-MET    PLAN     Pt is discharged from Physical Therapy.     Plan of care Certification: 5/2/2023 to 5/19/2023.     Outpatient Physical Therapy 2 times weekly for 3 weeks to include the following interventions: Electrical Stimulation  , Manual Therapy, Moist Heat/ Ice, Patient Education, Therapeutic Activities, Therapeutic Exercise, and Ultrasound.    Gabbie Weathers, PT

## 2024-02-14 DIAGNOSIS — M54.50 LOW BACK PAIN, UNSPECIFIED: Primary | ICD-10-CM

## 2024-02-15 ENCOUNTER — CLINICAL SUPPORT (OUTPATIENT)
Dept: REHABILITATION | Facility: HOSPITAL | Age: 79
End: 2024-02-15
Payer: COMMERCIAL

## 2024-02-15 DIAGNOSIS — G89.29 CHRONIC LEFT-SIDED LOW BACK PAIN WITH LEFT-SIDED SCIATICA: Primary | ICD-10-CM

## 2024-02-15 DIAGNOSIS — M54.50 LOW BACK PAIN, UNSPECIFIED: ICD-10-CM

## 2024-02-15 DIAGNOSIS — M54.42 CHRONIC LEFT-SIDED LOW BACK PAIN WITH LEFT-SIDED SCIATICA: Primary | ICD-10-CM

## 2024-02-15 PROCEDURE — 97163 PT EVAL HIGH COMPLEX 45 MIN: CPT

## 2024-02-15 NOTE — PROGRESS NOTES
"[facility]  Physical Therapy Initial Evaluation    Name: Rianna Devi  Clinic Number: [id number]    Therapy Diagnosis: No diagnosis found.  Physician: Jeanna Aiken FNP    Physician Orders: {AMB PT KNEE ORDERS:13784} ***  Medical Diagnosis from Referral: LBP, unspecified  Evaluation Date: 2/15/2024  Authorization Period Expiration: ***  Visit # / Visits authorized: 1/ ***    Time In: ***  Time Out: ***  Total Appointment Time (timed & untimed codes): *** minutes    Precautions: Fall and RA, HTN     Subjective     Date of onset: 2024??  History of current condition - Rianna reports: ***     Medical History:   Past Medical History:   Diagnosis Date    Hypertension    Rheumatoid Arthritis    Surgical History:   Rianna Devi  has a past surgical history that includes Hysterectomy and Cholecystectomy.    Medications:   Rianna has a current medication list which includes the following prescription(s): atorvastatin, celecoxib, cyclobenzaprine, duloxetine, folic acid, lisinopril, methotrexate, naproxen, and omeprazole.    Allergies:   Review of patient's allergies indicates:   Allergen Reactions    Codeine Itching    Pcn [penicillins]      itching        Imaging:   {DMJIMAGIN}    Prior Therapy: Pt received outpt PT at [facility] March-May 2023 for balance, and UE/LE pain  Social History: lives alone   Occupation: retired  Prior Level of Function: Could do yard work, house work. Amb'd without AD.Could drive herself.   Current Level of Function: ***    Pain:  Current {0-10:::"0"}/10, worst {0-10:::"0"}/10, best {0-10:::"0"}/10   Location: {RIGHT LEFT BILATERAL:79724} {LOCATION ON BODY:79317}  Description: {Pain Description:45795}  Aggravating Factors: {Causes; Pain:63343}  Easing Factors: {Pain (activities that relieve):46463}    Pts goals: ***    Objective     Posture:  Standing lordosis: {amsincreasedecrease:59063}  Sitting lordosis: {amsincreasedecrease:19347}  Iliac crest " height: {DMJLEFTRIGHTINCREASEDEQUAL:61987:x}  PSIS height: {DMJLEFTRIGHTINCREASEDEQUAL:48066}  Pelvic rotation/torsion: {YES/NO:20267}  Scoliosis: {YES/NO:20267}  Lateral shift: {RUSH DESC RIGHT/LEFT:72835}    Trunk range of motion:  Thoracolumbar AROM Pain/Dysfunction with Movement   Flexion {DMJWFL:93439}    Extension {DMJWFL:20103}    Right lateral flexion {DMJWFL:43935}    Left lateral flexion {DMJWFL:18946}    Right rotation {DMJWFL:60750}    Left rotation {DMJWFL:51391}      Manual muscle test:  Muscle Right left   Hip flexion {MMT number:39328:::1} {MMT strength:25193:::1}   Hip abduction {MMT strength:42145:::1} {MMT strength:03495:::1}   Knee extension {MMT strength:40911:::1} {MMT strength:93239:::1}   Knee flexion  {MMT strength:92131:::1} {MMT strength:30179:::1}   Ankle dorsiflexion {MMT strength:29120:::1} {MMT strength:89866:::1}   Ankle plantar flexion  {MMT strength:16905:::1} {MMT strength:37706:::1}   Extensor hallucis longus {MMT strength:28567:::1} {MMT strength:59710:::1}     Hip/knee range of motion:  Motion Right Left    Hip flexion (120) {DMJWFL:25385} {DMJWFL:20014}   Hip extension {DMJWFL:01464} {DMJWFL:91716}   Hip abduction {DMJWFL:27040} {DMJWFL:28134}   Hip adduction {DMJWFL:10443} {DMJWFL:86944}   Internal rotation (45) {DMJWFL:39169} {DMJWFL:91848}   External rotation (45) {DMJWFL:20916} {DMJWFL:79583}   Hamstring 90/90 (-10) {DMJWFL:87750} {DMJWFL:61957}   Knee extension {DMJWFL:81284} {DMJWFL:14784}   Knee flexion (120) {DMJWFL:29504} {DMJWFL:46194}   Other *** ***   Other *** ***     Clinical Special Tests:  SLR test < 60 degrees: right {POSITIVE/NEGATIVE:79510}; left {POSITIVE/NEGATIVE:02504}  SLR test > 60 degrees: right {POSITIVE/NEGATIVE:20807}; left {POSITIVE/NEGATIVE:04374}  Sitting slump test: right {POSITIVE/NEGATIVE:20072}; left {POSITIVE/NEGATIVE:71779}  Piriformis test: right {POSITIVE/NEGATIVE:50939}; left {POSITIVE/NEGATIVE:23636}  TAHIRA test: right  "{POSITIVE/NEGATIVE:15665}; left {POSITIVE/NEGATIVE:19716}  SI forward bend: right {POSITIVE/NEGATIVE:56002}; left {POSITIVE/NEGATIVE:98903}  SI distraction: right {POSITIVE/NEGATIVE:07138}; left {POSITIVE/NEGATIVE:01500}  SI compression: right {POSITIVE/NEGATIVE:15505}; left {POSITIVE/NEGATIVE:21929}  Bilateral SLR: right {POSITIVE/NEGATIVE:52444}; left {POSITIVE/NEGATIVE:63470}  Long sit test: right {POSITIVE/NEGATIVE:74413}; left {POSITIVE/NEGATIVE:73519}    Gait assessment:  Step length: {DESC INCREASED/DECREASED:18676} {RIGHT/LEFT:20294}  Step width: {DESC INCREASED/DECREASED:99770} {RIGHT/LEFT:20294}  Clementine: {DESC INCREASED/DECREASED:27101} {RIGHT/LEFT:20294}  Antalgic gait: {YES/NO:20267}  Assistive device: {Assistive Device:23876}  Ambulation deviations: ***  Stairs: ***    Spinal mobility: ***    Palpation: ***    Comments: ***    Limitation/Restriction for FOTO Intake Survey    Therapist reviewed FOTO scores for Rianna Devi on 2/15/2024.   FOTO documents entered into EP[employer] - see Media section.    Limitation Score: ***%       TREATMENT     Home Exercises and Patient Education Provided    Education provided:   ***    Written Home Exercises Provided: {Blank single:85119::"yes","Patient instructed to cont prior HEP"}.  Exercises were reviewed and Rianna was able to demonstrate them prior to the end of the session.  Rianna demonstrated {Desc; good/fair/poor:16456} understanding of the education provided.     See EMR under {Blank single:32238::"Media","Patient Instructions"} for exercises provided {Blank single:78835::"2/15/2024","prior visit"}.    Assessment     Rianna is a 78 y.o. female referred to outpatient Physical Therapy with a medical diagnosis of LBP unspecified. Pt presents with ***    Pt prognosis is Good.   Pt will benefit from skilled outpatient Physical Therapy to address the deficits stated above and in the chart below, provide pt/family education, and to maximize pt's level of independence. " "    Plan of care discussed with patient: Yes  Pt's spiritual, cultural and educational needs considered and patient is agreeable to the plan of care and goals as stated below:     Anticipated Barriers for therapy: none    Decision Making/ Complexity Score: high    Goals:    Short Term Goals: *** weeks   ***    Long Term Goals: *** weeks   ***    Plan     Plan of care Certification: 2/15/2024 to ***.    Outpatient Physical Therapy {NUMBERS 1-5:72510} times weekly for {0-10:03694::"0"} weeks to include the following interventions: {TX PLAN:56512}.       Gabbie Weathers, PT  [facility]  Physical Therapy Department      I CERTIFY THE NEED FOR THESE SERVICES FURNISHED UNDER THIS PLAN OF TREATMENT AND WHILE UNDER MY CARE.      Physician's Signature: _________________________________________  Date: __________________________     "

## 2024-02-15 NOTE — PLAN OF CARE
[facility]  Physical Therapy Initial Evaluation    Name: Rianna Devi  Clinic Number: [id number]    Therapy Diagnosis:   Encounter Diagnoses   Name Primary?    Low back pain, unspecified     Chronic left-sided low back pain with left-sided sciatica Yes     Physician: Jeanna Aiken FNP    Physician Orders: PT Eval and Treat   Medical Diagnosis from Referral: LBP unspecified  Evaluation Date: 2/15/2024  Authorization Period Expiration: *3/29/2024  Visit # / Visits authorized: 1/ 9    Time In: 0945  Time Out: 1052  Total Appointment Time (timed & untimed codes): 67 minutes    Precautions: Standard and Fall    Subjective     Date of onset: 3-4 months ago  History of current condition - Rianna reports: Gradual increase in intermittent Left LBP past 3-4 months. Pain is located at left PSIS and extending to left hip. She denies any injury to cause her pain. She is currently taking Tramadol  which she states is helping.     Medical History:   Past Medical History:   Diagnosis Date    Hypertension    Rheumatoid Arthritis    Surgical History:   Rianna Devi  has a past surgical history that includes Hysterectomy and Cholecystectomy.    Medications:   Rianna has a current medication list which includes the following prescription(s): atorvastatin, celecoxib, cyclobenzaprine, duloxetine, folic acid, lisinopril, methotrexate, naproxen, and omeprazole.    Allergies:   Review of patient's allergies indicates:   Allergen Reactions    Codeine Itching    Pcn [penicillins]      itching        Imaging:   X-ray lumbar spine: +scoliosis    Prior Therapy: Pt received outpt PT for balance and UE/LE strengthening from March-May 2023.  Social History: lives alone. 3 steps to enter home.  Occupation: retired  Prior Level of Function: Able to perform ADLs a 1.5 yrs ago.  Current Level of Function: Not able to lift items. Pain wakes her up in the middle of the night. Increased pain with sweeping floors and not able  to vacuum.     Pain:  Current 6/10, worst 6/10, best 0/10   Location: left back from L5 over spine extending to lateral hip  Description: Aching  Aggravating Factors: Sitting, Coughing/Sneezing, Morning, Flexing, Lifting, and Getting out of bed/chair  Easing Factors: pain medication, hot bath, rest, and walking    Pts goals: Decrease back pain.    Objective     Posture:  Standing lordosis: Increased  Sitting lordosis: Decreased  Iliac crest height: left, increased  PSIS height: left, increased  Pelvic rotation/torsion: No  Scoliosis: Yes  Lateral shift: no    Trunk range of motion:  Thoracolumbar AROM Pain/Dysfunction with Movement   Flexion 100% NO   Extension 100% NO   Right lateral flexion 100% NO   Left lateral flexion 100% YES   Right rotation 100% NO   Left rotation 100% NO     Manual muscle test:  Muscle Right left   Hip flexion MMT number: 4/5 MMT strength: 4-/5   Hip abduction MMT strength: 5/5 MMT strength: 4/5   Knee extension MMT strength: 5/5 MMT strength: 5/5   Knee flexion  MMT strength: 4+/5 MMT strength: 4+/5   Ankle dorsiflexion MMT strength: 5/5 MMT strength: 5/5   Ankle plantar flexion  MMT strength: 5/5 MMT strength: 5/5   Extensor hallucis longus       Hip/knee range of motion:  Motion Right Left    Hip flexion (120) 112 102   Hip extension WFL WFL   Hip abduction WFL WFL   Hip adduction 30 20   Internal rotation (45) 28 35   External rotation (45) 38 28   Hamstring 90/90 (-10) 0 0   Knee extension 0 0   Knee flexion (120) 130 127   SLR 80 80   Other       Clinical Special Tests:  SLR test < 60 degrees: right Negative; left Negative  SLR test > 60 degrees: right Negative; left Negative  Sitting slump test: right Negative; left Negative  Piriformis test: right Negative; left Negative  TAHIRA test: right Negative; left Negative  SI forward bend: right NT; left NT  SI distraction: right Negative; left Negative  SI compression: right Negative; left Negative  Bilateral SLR: right Negative; left  Negative  Long sit test: right Negative; left Negative    Gait assessment:  Step length: WFL   Step width: WFL   Clementine: WFL   Antalgic gait: No  Assistive device: none  Ambulation deviations: WFL  Stairs: indep    Spinal mobility: WFL lumbar spine. No pain increase during P-A mobs to lumbar spine.    Palpation: Painful trigger points about left PSIS. No pain left Piriformis, Quadratus Lumborum, Lumbar paraspinal or ITB.      Comments: LLE longer in standing and supine. PSIS equal in sitting.    Limitation/Restriction for FOTO Intake Survey    Therapist reviewed FOTO scores for Rianna Devi on 2/15/2024.   FOTO documents entered into [employer] - see Media section.    Limitation Score: 41%       TREATMENT     Home Exercises and Patient Education Provided    Education provided:   Result of eval, POC, Back Protection, HEP.    Written Home Exercises Provided: yes.Back Protection handout, resisted hip abd in sitting  Exercises were reviewed and Rianna was able to demonstrate them prior to the end of the session.  Rianna demonstrated good  understanding of the education provided.     See EMR under Media for exercises provided 2/15/2024.    Assessment     Rianna is a 78 y.o. female referred to outpatient Physical Therapy with a medical diagnosis of LBP unspecified. Pt presents with intermittent pain up to 6/10 from left PSIS to hip which abolished  this visit with trigger point release. Pt was negative for testing for presence of bulged lumbar disc or SIJ dysfunction, although PT suspects one or both of these may have initially caused her pain,but has since resolved and now she is left with painful trigger points about left PSIS. Pt has a longer left leg in standing and supine, but is negative Long Sit test. Her pelvis is equal in sitting. Pt has trunk ROM WNL, but has pain with left lateral flexion. He LLE is weaker than RLE in hip flexion and abduction. She has decreased ROM left hip with decreased flexibility in left  Piriformis, Lef ITB and left glutes.    Pt prognosis is Excellent.   Pt will benefit from skilled outpatient Physical Therapy to address the deficits stated above and in the chart below, provide pt/family education, and to maximize pt's level of independence.     Plan of care discussed with patient: Yes  Pt's spiritual, cultural and educational needs considered and patient is agreeable to the plan of care and goals as stated below:     Anticipated Barriers for therapy: none    Decision Making/ Complexity Score: high    Goals:    Short Term Goals: 2 weeks   Pt indep with HEP.  Pt's pain will be no higher than 4/10.  Pt's leg length discrepancy will be equalized.    Long Term Goals: 4 weeks   Pt indep with self-treatment techniques.  Pt's pain will be no higher than 2/10.  Pt's flexibility of left Piriformis, ITB and Glutes will be WFL.  Pt will be able to left laterally flex trunk without pain.  Pt will gain 5 degrees left hip.  Pt will have 4+/5 strength olivier hip flex and left hip abd.    Plan     Plan of care Certification: 2/15/2024 to *3/29/2024.    Outpatient Physical Therapy 2 times weekly for 4 weeks to include the following interventions: Electrical Stimulation (97431) Manual Therapy (57883), Moist Heat/Ice, Neuromuscular Re-Ed (35154), Patient Education, Therapeutic Exercise (31719), Ultrasound (10654)      Gabbie Weathers, PT  [facility]  Physical Therapy Department      I CERTIFY THE NEED FOR THESE SERVICES FURNISHED UNDER THIS PLAN OF TREATMENT AND WHILE UNDER MY CARE.      Physician's Signature: _________________________________________  Date: __________________________

## 2024-02-21 ENCOUNTER — CLINICAL SUPPORT (OUTPATIENT)
Dept: REHABILITATION | Facility: HOSPITAL | Age: 79
End: 2024-02-21
Payer: COMMERCIAL

## 2024-02-21 DIAGNOSIS — M54.50 LOW BACK PAIN, UNSPECIFIED BACK PAIN LATERALITY, UNSPECIFIED CHRONICITY, UNSPECIFIED WHETHER SCIATICA PRESENT: Primary | ICD-10-CM

## 2024-02-21 PROCEDURE — 97110 THERAPEUTIC EXERCISES: CPT | Mod: CQ

## 2024-02-21 PROCEDURE — 97140 MANUAL THERAPY 1/> REGIONS: CPT | Mod: CQ

## 2024-02-21 NOTE — PROGRESS NOTES
"  [facility]   Physical Therapy Treatment Note       Name: Rianna Devi  Clinic Number: [id number]    Therapy Diagnosis: No diagnosis found.  Physician: Jeanna Aiken FNP    Visit Date: 2/21/2024    Physician: Jeanna Aiken FNP     Physician Orders: PT Eval and Treat   Medical Diagnosis from Referral: LBP unspecified  Evaluation Date: 2/15/2024  Authorization Period Expiration: *3/29/2024  Visit # / Visits authorized: 2/9     Time In: 0834  Time Out: 0921  Total Appointment Time (timed & untimed codes): 47 minutes     Precautions: Standard and Fall    PTA Visit #: 1/5       SUBJECTIVE     Pt reports: Patient stated that she had pain in her low back and L hip prior to treatment.  She was compliant with home exercise program.  Response to previous treatment: Patient stated that she felt "pretty good."   Functional change: Too early in POC.     Pain: 5/10  Location: bilateral low back and left hip    OBJECTIVE     Objective Measures updated at progress report unless specified.     Treatment     Rianna received the treatments listed below:      therapeutic exercises to develop strength, ROM, and flexibility for 32 minutes including: including exercises below and review of back protection handout.     Nustep L2.5 with BLE only 7 minutes    L SKTC stretch  30" x2   Piriformis stretch  30" x2   SLR on LLE with 1.5# 10 x2   Hip ABD with blue TB  20x2   Standing ITB stretch on LLE  30" x2                                             manual therapy techniques: Trigger point releases were applied to the: About the L PSIS and Iliac crest for 10 minutes    direct contact modalities after being cleared for contraindications: Ultrasound:  Rianna received ultrasound to manage pain and inflammation at NA % duty cycle applied to the NA at an intensity of  number entry box W/cm2  for a duration of 0 minutes. Patient tolerated treatment well without adverse effects. Therapist was in attendance throughout " intervention. (Not today)     supervised modalities after being cleared for contradictions: IFC Electrical Stimulation:  Rianna received IFC Electrical Stimulation for pain control applied to the NA. Pt received stimulation at NA % scan at a frequency of NA for 0 minutes. Rianna tolderated treatment well without any adverse effects. (Not today)     hot pack for 10 minutes to Bilateral low back. (5 minutes used to review back protection handout during this time)     cold pack for 0 minutes to (Not today).        Patient Education and Home Exercises     Home Exercises Provided and Patient Education Provided     Education provided:   - Back protection handout     Written Home Exercises Provided: Patient instructed to cont prior HEP. Exercises were reviewed and Rianna was able to demonstrate them prior to the end of the session.  Rianna demonstrated good  understanding of the education provided. See EMR under Patient Instructions for exercises provided during therapy sessions    ASSESSMENT   PTA provided patient with visual and verbal cues for proper form and hold times of treatment tasks. Patient had multiple trigger points, which she reported eased with manual therapy. After completion of treatment patient stated that her pain decreased to a 2/10 and that it was only in the middle of her low back.       Rianna Is progressing well towards her goals.   Pt prognosis is Excellent.     Pt will continue to benefit from skilled outpatient physical therapy to address the deficits listed in the problem list box on initial evaluation, provide pt/family education and to maximize pt's level of independence in the home and community environment.     Pt's spiritual, cultural and educational needs considered and pt agreeable to plan of care and goals.     Anticipated Barriers for therapy: none     Decision Making/ Complexity Score: high     Goals:     Short Term Goals: 2 weeks   Pt indep with HEP.  Pt's pain will be no higher than  4/10.  Pt's leg length discrepancy will be equalized.     Long Term Goals: 4 weeks   Pt indep with self-treatment techniques.  Pt's pain will be no higher than 2/10.  Pt's flexibility of left Piriformis, ITB and Glutes will be WFL.  Pt will be able to left laterally flex trunk without pain.  Pt will gain 5 degrees left hip.  Pt will have 4+/5 strength olivier hip flex and left hip abd.    PLAN     Plan of care Certification: 2/15/2024 to *3/29/2024.     Outpatient Physical Therapy 2 times weekly for 4 weeks to include the following interventions: Electrical Stimulation (89460) Manual Therapy (05496), Moist Heat/Ice, Neuromuscular Re-Ed (83329), Patient Education, Therapeutic Exercise (34681), Ultrasound (94752)    Swapna Tovar, PTA

## 2024-02-23 ENCOUNTER — CLINICAL SUPPORT (OUTPATIENT)
Dept: REHABILITATION | Facility: HOSPITAL | Age: 79
End: 2024-02-23
Payer: COMMERCIAL

## 2024-02-23 DIAGNOSIS — M54.50 LOW BACK PAIN, UNSPECIFIED BACK PAIN LATERALITY, UNSPECIFIED CHRONICITY, UNSPECIFIED WHETHER SCIATICA PRESENT: Primary | ICD-10-CM

## 2024-02-23 PROCEDURE — 97140 MANUAL THERAPY 1/> REGIONS: CPT | Mod: CQ

## 2024-02-23 PROCEDURE — 97110 THERAPEUTIC EXERCISES: CPT | Mod: CQ

## 2024-02-23 NOTE — PROGRESS NOTES
"  [facility]   Physical Therapy Treatment Note       Name: Rianna Devi  Clinic Number: [id number]    Therapy Diagnosis:   Encounter Diagnosis   Name Primary?    Low back pain, unspecified back pain laterality, unspecified chronicity, unspecified whether sciatica present Yes     Physician: Jeanna Aiken FNP    Visit Date: 2/23/2024    Physician: Jeanna Aiken FNP     Physician Orders: PT Eval and Treat   Medical Diagnosis from Referral: LBP unspecified  Evaluation Date: 2/15/2024  Authorization Period Expiration: *3/29/2024  Visit # / Visits authorized: 3/9     Time In: 0838  Time Out: 0921  Total Appointment Time (timed & untimed codes): 43 minutes     Precautions: Standard and Fall    PTA Visit #: 2/5       SUBJECTIVE     Pt reports: Patient stated that she had pain in her bilateral hips prior to treatment.   She was compliant with home exercise program.  Response to previous treatment: Patient stated that she felt "pretty good."   Functional change: Too early in POC.     Pain: 5/10, 2/10  Location: L hip extending to L knee, R hip     OBJECTIVE     Objective Measures updated at progress report unless specified.     Treatment     Rianna received the treatments listed below:      therapeutic exercises to develop strength, ROM, and flexibility for 28 minutes including:    Nustep L3 with BLE only 7 minutes    L SKTC stretch  30" x2   Piriformis stretch  30" x2   SLR on BLE with 1.5# 15x   Hip ABD with blue TB  20x2   Standing ITB stretch on LLE  30" x2   Sidelying L hip ABD with 1.5# 10x2                                         manual therapy techniques: Trigger point releases were applied around the L PSIS and Iliac crest, along with foam rolling to the L glute, QL, piriformis, IT band, and around L iliac crest for 15 minutes.     direct contact modalities after being cleared for contraindications: Ultrasound:  Rianna received ultrasound to manage pain and inflammation at NA % duty " cycle applied to the NA at an intensity of  number entry box W/cm2  for a duration of 0 minutes. Patient tolerated treatment well without adverse effects. Therapist was in attendance throughout intervention. (Not today)     supervised modalities after being cleared for contradictions: IFC Electrical Stimulation:  Rianna received IFC Electrical Stimulation for pain control applied to the NA. Pt received stimulation at NA % scan at a frequency of NA for 0 minutes. Rianna tolderated treatment well without any adverse effects. (Not today)     hot pack for 0 minutes to Bilateral low back. (Not today)     cold pack for 0 minutes to (Not today).        Patient Education and Home Exercises     Home Exercises Provided and Patient Education Provided     Written Home Exercises Provided: Patient instructed to cont prior HEP. Exercises were reviewed and Rianna was able to demonstrate them prior to the end of the session.  Rianna demonstrated good  understanding of the education provided. See EMR under Patient Instructions for exercises provided during therapy sessions    ASSESSMENT   PTA provided patient with visual and verbal cues for proper form and hold times of treatment tasks. Patient declined MHP application this date. Patient stated that her L hip pain was still extending down to her knee, but she stated that it decreased to a 3/10 after treatment.         Rianna Is progressing well towards her goals.   Pt prognosis is Excellent.     Pt will continue to benefit from skilled outpatient physical therapy to address the deficits listed in the problem list box on initial evaluation, provide pt/family education and to maximize pt's level of independence in the home and community environment.     Pt's spiritual, cultural and educational needs considered and pt agreeable to plan of care and goals.     Anticipated Barriers for therapy: none     Decision Making/ Complexity Score: high     Goals:     Short Term Goals: 2 weeks   Pt  indep with HEP.  Pt's pain will be no higher than 4/10.  Pt's leg length discrepancy will be equalized.     Long Term Goals: 4 weeks   Pt indep with self-treatment techniques.  Pt's pain will be no higher than 2/10.  Pt's flexibility of left Piriformis, ITB and Glutes will be WFL.  Pt will be able to left laterally flex trunk without pain.  Pt will gain 5 degrees left hip.  Pt will have 4+/5 strength olivier hip flex and left hip abd.    PLAN     Plan of care Certification: 2/15/2024 to *3/29/2024.     Outpatient Physical Therapy 2 times weekly for 4 weeks to include the following interventions: Electrical Stimulation (53464) Manual Therapy (86042), Moist Heat/Ice, Neuromuscular Re-Ed (18659), Patient Education, Therapeutic Exercise (45698), Ultrasound (61915)    Swapna Tovar, PTA

## 2024-02-28 ENCOUNTER — CLINICAL SUPPORT (OUTPATIENT)
Dept: REHABILITATION | Facility: HOSPITAL | Age: 79
End: 2024-02-28
Payer: COMMERCIAL

## 2024-02-28 DIAGNOSIS — M54.50 LOW BACK PAIN, UNSPECIFIED BACK PAIN LATERALITY, UNSPECIFIED CHRONICITY, UNSPECIFIED WHETHER SCIATICA PRESENT: Primary | ICD-10-CM

## 2024-02-28 PROCEDURE — 97110 THERAPEUTIC EXERCISES: CPT | Mod: CQ

## 2024-02-28 PROCEDURE — 97140 MANUAL THERAPY 1/> REGIONS: CPT | Mod: CQ

## 2024-02-28 NOTE — PROGRESS NOTES
"  [facility]   Physical Therapy Treatment Note       Name: Rianna Devi  Clinic Number: [id number]    Therapy Diagnosis:   Encounter Diagnosis   Name Primary?    Low back pain, unspecified back pain laterality, unspecified chronicity, unspecified whether sciatica present Yes     Physician: Jeanna Aiken FNP    Visit Date: 2/28/2024    Physician: Jeanna Aiken FNP     Physician Orders: PT Eval and Treat   Medical Diagnosis from Referral: LBP unspecified  Evaluation Date: 2/15/2024  Authorization Period Expiration: *3/29/2024  Visit # / Visits authorized: 4/9     Time In: 0831  Time Out: 0910  Total Appointment Time (timed & untimed codes): 39 minutes     Precautions: Standard and Fall    PTA Visit #: 3/5       SUBJECTIVE     Pt reports: Patient stated that she had pain in her L hip prior to treatment.   She was compliant with home exercise program.  Response to previous treatment: Patient with no reports of adverse effects.   Functional change: Too early in POC.     Pain: 3/10  Location: L hip    OBJECTIVE     Objective Measures updated at progress report unless specified.     Treatment     Rianna received the treatments listed below:      therapeutic exercises to develop strength, ROM, and flexibility for 29 minutes including:    Nustep L3 with BLE only 8 minutes    L SKTC stretch  30" x2   Piriformis stretch  30" x2   SLR on BLE with 1.5# 15x   Hip ABD with blue TB  20x2   Standing ITB stretch on LLE  30" x2   Sidelying L hip ABD with 1.5# 10x2                                         manual therapy techniques: Trigger point releases were applied around the L PSIS, Iliac crest, and QL along with foam rolling to the L glute, QL, piriformis, IT band, and around L iliac crest for 10 minutes.     direct contact modalities after being cleared for contraindications: Ultrasound:  Rianna received ultrasound to manage pain and inflammation at NA % duty cycle applied to the NA at an intensity " of  number entry box W/cm2  for a duration of 0 minutes. Patient tolerated treatment well without adverse effects. Therapist was in attendance throughout intervention. (Not today)     supervised modalities after being cleared for contradictions: IFC Electrical Stimulation:  Rianna received IFC Electrical Stimulation for pain control applied to the NA. Pt received stimulation at NA % scan at a frequency of NA for 0 minutes. Rianna tolderated treatment well without any adverse effects. (Not today)     hot pack for 0 minutes to Bilateral low back. (Not today)     cold pack for 0 minutes to (Not today).        Patient Education and Home Exercises     Home Exercises Provided and Patient Education Provided     Written Home Exercises Provided: Patient instructed to cont prior HEP. Exercises were reviewed and Rianna was able to demonstrate them prior to the end of the session.  Rianna demonstrated good  understanding of the education provided. See EMR under Patient Instructions for exercises provided during therapy sessions    ASSESSMENT   PTA provided patient with visual, tactile, and verbal cues for proper form and hold times of treatment tasks. Patient declined MHP application this date, and stated that she had no pain after completion of treatment.         Rianna Is progressing well towards her goals.   Pt prognosis is Excellent.     Pt will continue to benefit from skilled outpatient physical therapy to address the deficits listed in the problem list box on initial evaluation, provide pt/family education and to maximize pt's level of independence in the home and community environment.     Pt's spiritual, cultural and educational needs considered and pt agreeable to plan of care and goals.     Anticipated Barriers for therapy: none     Decision Making/ Complexity Score: high     Goals:     Short Term Goals: 2 weeks   Pt indep with HEP.  Pt's pain will be no higher than 4/10.  Pt's leg length discrepancy will be  equalized.     Long Term Goals: 4 weeks   Pt indep with self-treatment techniques.  Pt's pain will be no higher than 2/10.  Pt's flexibility of left Piriformis, ITB and Glutes will be WFL.  Pt will be able to left laterally flex trunk without pain.  Pt will gain 5 degrees left hip.  Pt will have 4+/5 strength olivier hip flex and left hip abd.    PLAN     Plan of care Certification: 2/15/2024 to *3/29/2024.     Outpatient Physical Therapy 2 times weekly for 4 weeks to include the following interventions: Electrical Stimulation (00842) Manual Therapy (69710), Moist Heat/Ice, Neuromuscular Re-Ed (96853), Patient Education, Therapeutic Exercise (42795), Ultrasound (95308)    Swapna Tovar, PTA

## 2024-03-06 ENCOUNTER — CLINICAL SUPPORT (OUTPATIENT)
Dept: REHABILITATION | Facility: HOSPITAL | Age: 79
End: 2024-03-06
Payer: COMMERCIAL

## 2024-03-06 DIAGNOSIS — M54.50 LOW BACK PAIN, UNSPECIFIED BACK PAIN LATERALITY, UNSPECIFIED CHRONICITY, UNSPECIFIED WHETHER SCIATICA PRESENT: Primary | ICD-10-CM

## 2024-03-06 PROCEDURE — 97110 THERAPEUTIC EXERCISES: CPT

## 2024-03-06 PROCEDURE — 97112 NEUROMUSCULAR REEDUCATION: CPT

## 2024-03-06 NOTE — PROGRESS NOTES
"  [facility]   Physical Therapy Treatment Note       Name: Rianna Devi  Clinic Number: [id number]    Therapy Diagnosis:   Encounter Diagnosis   Name Primary?    Low back pain, unspecified back pain laterality, unspecified chronicity, unspecified whether sciatica present Yes     Physician: Jeanna Aiken FNP    Visit Date: 3/6/2024    Physician: Jeanna Aiken FNP     Physician Orders: PT Eval and Treat   Medical Diagnosis from Referral: LBP unspecified  Evaluation Date: 2/15/2024  Authorization Period Expiration: 3/15/2024  Visit # / Visits authorized: 5/9     Time In: 0845  Time Out: 0930  Total Appointment Time (timed & untimed codes): 45 minutes     Precautions: Standard and Fall    PTA Visit #: 0/5       SUBJECTIVE     Pt reports: Patient stated that she had pain in central back.   She was compliant with home exercise program.  Response to previous treatment: Patient with no reports of adverse effects.   Functional change: Too early in POC.     Pain: 4/10  Location: L hip    OBJECTIVE     Objective Measures updated at progress report unless specified.     Treatment     Rianna received the treatments listed below:      therapeutic exercises to develop strength, ROM, and flexibility for 15 minutes including:    Nustep L3 with BLE only 8 minutes (not today)   L SKTC stretch  30" x2 (not today)   Piriformis stretch  30" x2 (not today)   SLR on BLE with 1.5# 15x (not today)   Hip ABD with blue TB  20x2 (not today)   Standing ITB stretch on LLE  30" x2 (not today)   Sidelying L hip ABD with 1.5# 10x2 (not today)   *Prone prop on elbows 2 x 1 minute   *Lumbar trunk ext, standing 2x with sustained hold   Lumbar trunk ext, seated 2x with sustained hold   *Prone trunk ext 10x   *Multifidus in s-l'g, olivier 10x                     manual therapy techniques: P-A mobs olivier lumbosacral spine for 10 minutes.     Neuromuscular re-ed Back education and posture: Pt re-issued Back Protection handout " which was reviewed in length verbally and with demonstration, pt education in how bulged disc develops/is reduced, discussed multiple examples of how to perform activities while protecting lumbar spine. 20 minutes.    direct contact modalities after being cleared for contraindications: Ultrasound:  Rianna received ultrasound to manage pain and inflammation at NA % duty cycle applied to the NA at an intensity of  number entry box W/cm2  for a duration of 0 minutes. Patient tolerated treatment well without adverse effects. Therapist was in attendance throughout intervention. (Not today)     supervised modalities after being cleared for contradictions: IFC Electrical Stimulation:  Rianna received IFC Electrical Stimulation for pain control applied to the NA. Pt received stimulation at NA % scan at a frequency of NA for 0 minutes. Rianna tolderated treatment well without any adverse effects. (Not today)     hot pack for 6 minutes to Bilateral low back in sitting while reviewing HEP.     cold pack for 0 minutes to (Not today).        Patient Education and Home Exercises     Home Exercises Provided and Patient Education Provided     Written Home Exercises Provided: yes. Prone prop on elbows, Lumbar trunk ext, Prone trunk ext, Multifidus in s-l'g.  Exercises were reviewed and Rianna was able to demonstrate them prior to the end of the session.  Rianna demonstrated good  understanding of the education provided. See EMR under Patient Instructions for exercises provided during therapy sessions    ASSESSMENT   Pt re-issued/reviewed again Back Protection handout due to pt observed to sit in poor posture. Back HEP issued. Central LBP decreased from 4/10 to 0/10 post-treatment.        Rianna Is progressing well towards her goals.   Pt prognosis is Excellent.     Pt will continue to benefit from skilled outpatient physical therapy to address the deficits listed in the problem list box on initial evaluation, provide pt/family  education and to maximize pt's level of independence in the home and community environment.     Pt's spiritual, cultural and educational needs considered and pt agreeable to plan of care and goals.     Anticipated Barriers for therapy: none     Decision Making/ Complexity Score: high     Goals:     Short Term Goals: 2 weeks   Pt indep with HEP.-PROGRESSING  Pt's pain will be no higher than 4/10.-MET  Pt's leg length discrepancy will be equalized.     Long Term Goals: 4 weeks   Pt indep with self-treatment techniques.  Pt's pain will be no higher than 2/10.  Pt's flexibility of left Piriformis, ITB and Glutes will be WFL.  Pt will be able to left laterally flex trunk without pain.  Pt will gain 5 degrees left hip.  Pt will have 4+/5 strength olivier hip flex and left hip abd.    PLAN     Cont with Back education and strengthening.    Plan of care Certification: 2/15/2024 to 3/15/2024.     Outpatient Physical Therapy 2 times weekly for 4 weeks to include the following interventions: Electrical Stimulation (08095) Manual Therapy (17869), Moist Heat/Ice, Neuromuscular Re-Ed (31707), Patient Education, Therapeutic Exercise (27757), Ultrasound (01119)    Gabbie Weathers, PT

## 2024-03-08 ENCOUNTER — CLINICAL SUPPORT (OUTPATIENT)
Dept: REHABILITATION | Facility: HOSPITAL | Age: 79
End: 2024-03-08
Payer: COMMERCIAL

## 2024-03-08 DIAGNOSIS — M54.50 LOW BACK PAIN, UNSPECIFIED BACK PAIN LATERALITY, UNSPECIFIED CHRONICITY, UNSPECIFIED WHETHER SCIATICA PRESENT: Primary | ICD-10-CM

## 2024-03-08 PROCEDURE — 97110 THERAPEUTIC EXERCISES: CPT

## 2024-03-08 PROCEDURE — 97140 MANUAL THERAPY 1/> REGIONS: CPT

## 2024-03-08 NOTE — PROGRESS NOTES
"  [facility]   Physical Therapy Treatment Note       Name: Rianna Devi  Clinic Number: [id number]    Therapy Diagnosis:   No diagnosis found.    Physician: Jeanna Aiken FNP    Visit Date: 3/8/2024    Physician: Jeanna Aiken FNP     Physician Orders: PT Eval and Treat   Medical Diagnosis from Referral: LBP unspecified  Evaluation Date: 2/15/2024  Authorization Period Expiration: 3/15/2024  Visit # / Visits authorized: 6/9     Time In: 0839  Time Out: 0909  Total Appointment Time (timed & untimed codes): 30 minutes     Precautions: Standard and Fall    PTA Visit #: 0/5       SUBJECTIVE     Pt reports: Patient stated that she had "soreness" in olivier hips d/t Tramadol injections yesterday by PCP for a migraine headache. States she reviewed Back Protection handout and states she understands it well. States she has been performing new HEP and abdominal bracing. Pt informed that next week will be her last and she expressed agreement.     She was compliant with home exercise program.  Response to previous treatment: Patient with no reports of adverse effects.   Functional change: Pt's radicular pain has centralized.     Pain: 2/10  Location: centralized at L5-S1    OBJECTIVE     Left Lateral Trunk Flexion= 100% without pain  Flexibility WFL of Left Piriformis, left ITB and left Glutes.  Treatment     Rianna received the treatments listed below:      therapeutic exercises to develop strength, ROM, and flexibility for 17 minutes including:    Nustep L3 with BLE only 8 minutes (not today)   L SKTC stretch  30" x2 (not today)   Piriformis stretch  30" x2 (not today)   SLR on BLE with 1.5# 15x (not today)   Hip ABD with blue TB  20x2 (not today)   Standing ITB stretch on LLE  30" x2 (not today)   Sidelying L hip ABD with 1.5# 15x2 (no wt today d/t injection yesterday)   *Prone prop on elbows 2 x 1 minute   *Lumbar trunk ext, standing 2x with sustained hold   Lumbar trunk ext, seated 2x with " "sustained hold (not today)   *Prone trunk ext 15x   *Multifidus in s-l'g, olivier 15x                     manual therapy techniques: P-A mobs olivier lumbosacral spine in prone and in prone prop on elbows. Pt was palpated for trigger points, but none found in Piriformis, QL, lumbar paraspinals or about sacrum.  8 minutes.     Neuromuscular re-ed Back education and posture: Pt issued a 3/16" right heel lift to equalize pelvis in standing. Pt educated in use of heel lift and where to purchase. 5 minutes.    direct contact modalities after being cleared for contraindications: Ultrasound:  Rianna received ultrasound to manage pain and inflammation at NA % duty cycle applied to the NA at an intensity of  number entry box W/cm2  for a duration of 0 minutes. Patient tolerated treatment well without adverse effects. Therapist was in attendance throughout intervention. (Not today)     supervised modalities after being cleared for contradictions: IFC Electrical Stimulation:  Rianna received IFC Electrical Stimulation for pain control applied to the NA. Pt received stimulation at NA % scan at a frequency of NA for 0 minutes. Rianna tolderated treatment well without any adverse effects. (Not today)     hot pack for 0 minutes to Bilateral low back. (Pt declined)    cold pack for 0 minutes to (Not today).        Patient Education and Home Exercises     Home Exercises Provided and Patient Education Provided     Written Home Exercises Provided: Patient instructed to cont prior HEP.   Exercises were reviewed and Rianna was able to demonstrate them prior to the end of the session.  Rianna demonstrated good  understanding of the education provided. See EMR under Patient Instructions for exercises provided during therapy sessions    ASSESSMENT   Central LBP decreased from 2/10 to 0/10 post-treatment. Pt issued a right 3/16" heel lift which effectively equalized pelvis meeting STG 3. She also met STG 1 and LTG 3 and 4 today.      Rianna Is " progressing well towards her goals.   Pt prognosis is Excellent.     Pt will continue to benefit from skilled outpatient physical therapy to address the deficits listed in the problem list box on initial evaluation, provide pt/family education and to maximize pt's level of independence in the home and community environment.     Pt's spiritual, cultural and educational needs considered and pt agreeable to plan of care and goals.     Anticipated Barriers for therapy: none     Decision Making/ Complexity Score: high     Goals:     Short Term Goals: 2 weeks   Pt indep with HEP.-MET  Pt's pain will be no higher than 4/10.-MET  Pt's leg length discrepancy will be equalized.-MET     Long Term Goals: 4 weeks   Pt indep with self-treatment techniques.-PROGRESSING  Pt's pain will be no higher than 2/10.-PROGRESSING  Pt's flexibility of left Piriformis, ITB and Glutes will be WFL.-MET  Pt will be able to left laterally flex trunk without pain.-MET  Pt will gain 5 degrees left hip.  Pt will have 4+/5 strength olivier hip flex and left hip abd.    PLAN     Cont with Back education and strengthening. Will plan to dc next week.    Plan of care Certification: 2/15/2024 to 3/15/2024.     Outpatient Physical Therapy 2 times weekly for 4 weeks to include the following interventions: Electrical Stimulation (60243) Manual Therapy (19729), Moist Heat/Ice, Neuromuscular Re-Ed (78963), Patient Education, Therapeutic Exercise (10193), Ultrasound (90546)    Gabbie Weathers PT

## 2024-03-13 ENCOUNTER — CLINICAL SUPPORT (OUTPATIENT)
Dept: REHABILITATION | Facility: HOSPITAL | Age: 79
End: 2024-03-13
Payer: COMMERCIAL

## 2024-03-13 DIAGNOSIS — M54.50 LOW BACK PAIN, UNSPECIFIED BACK PAIN LATERALITY, UNSPECIFIED CHRONICITY, UNSPECIFIED WHETHER SCIATICA PRESENT: Primary | ICD-10-CM

## 2024-03-13 PROCEDURE — 97110 THERAPEUTIC EXERCISES: CPT

## 2024-03-13 NOTE — PROGRESS NOTES
" [facility]   Physical Therapy Treatment Note       Name: Rianna Devi  Clinic Number: [id number]    Therapy Diagnosis:   No diagnosis found.    Physician: Jeanna Aiken FNP    Visit Date: 3/13/2024    Physician: Jeanna Aiken FNP     Physician Orders: PT Eval and Treat   Medical Diagnosis from Referral: LBP unspecified  Evaluation Date: 2/15/2024  Authorization Period Expiration: 3/15/2024  Visit # / Visits authorized: 7/9     Time In: 0845  Time Out: 0917  Total Appointment Time (timed & untimed codes): 32 minutes     Precautions: Standard and Fall    PTA Visit #: 0/5       SUBJECTIVE     Pt reports: Patient stated that she has no pain today. Has been performing trunk ext ex to control pain which has helped. Pt agreeable to being discharged this visit.    She was compliant with home exercise program.  Response to previous treatment: Patient with no reports of adverse effects.   Functional change: Pt's radicular pain has centralized.     Pain: 0/10  Location: centralized at L5-S1    OBJECTIVE     MMT 3/13/24: Right Hip Flex= 4+/5, Left Hip Flex= 4+/5, Left Hip Abd=4+/5    Hip/knee range of motion:  Motion Right Left  EVAL   3/11/24   Hip flexion (120) 112 102                  116   Hip extension WFL WFL   Hip abduction WFL WFL   Hip adduction 30 20                      30   Internal rotation (45) 28 35                      38   External rotation (45) 38 28                      38   Hamstring 90/90 (-10) 0 0   Knee extension 0 0   Knee flexion (120) 130 127   SLR 80 80                        90     Treatment     Rianna received the treatments listed below:      therapeutic exercises to develop strength, ROM, and flexibility for 32 minutes including: MMT, ROM    Nustep L4.0 with BLE only 8 minutes    L SKTC stretch  30" x2 (not today)   Piriformis stretch  30" x2 (not today)   SLR on BLE with 1.5# 15x (not today)   Hip ABD with blue TB  20x2 (not today)   Standing ITB stretch on LLE  " "30" x2 (not today)   Sidelying L hip ABD with 2# 20x   *Prone prop on elbows 1 x 1 minute   *Lumbar trunk ext, standing 2x with sustained hold   Lumbar trunk ext, seated 2x with sustained hold    *Prone trunk ext 20x   *Multifidus in s-l'g, olivier 20x   Hip ext in Prone 10x   Clamshells, olivier, green tband 30x           manual therapy techniques: P-A mobs olivier lumbosacral spine in prone.  2 minutes.     Neuromuscular re-ed Back education and posture:l .0 minutes.    direct contact modalities after being cleared for contraindications: Ultrasound:  Rianna received ultrasound to manage pain and inflammation at NA % duty cycle applied to the NA at an intensity of  number entry box W/cm2  for a duration of 0 minutes. Patient tolerated treatment well without adverse effects. Therapist was in attendance throughout intervention. (Not today)     supervised modalities after being cleared for contradictions: IFC Electrical Stimulation:  Rianna received IFC Electrical Stimulation for pain control applied to the NA. Pt received stimulation at NA % scan at a frequency of NA for 0 minutes. Rianna tolderated treatment well without any adverse effects. (Not today)     hot pack for 0 minutes to Bilateral low back. (Pt declined)    cold pack for 0 minutes to (Not today).        Patient Education and Home Exercises     Home Exercises Provided and Patient Education Provided -pt instructed to cont with Back Protection instructions.    Written Home Exercises Provided: Patient instructed to cont prior HEP.   Exercises were reviewed and Rianna was able to demonstrate them prior to the end of the session.  Rianna demonstrated good  understanding of the education provided. See EMR under Patient Instructions for exercises provided during therapy sessions    ASSESSMENT     Pt has met all goals and is ready for discharge.    Rianna Is progressing well towards her goals.   Pt prognosis is Excellent.     Pt will continue to benefit from skilled " outpatient physical therapy to address the deficits listed in the problem list box on initial evaluation, provide pt/family education and to maximize pt's level of independence in the home and community environment.     Pt's spiritual, cultural and educational needs considered and pt agreeable to plan of care and goals.     Anticipated Barriers for therapy: none     Decision Making/ Complexity Score: high     Goals:     Short Term Goals: 2 weeks   Pt indep with HEP.-MET  Pt's pain will be no higher than 4/10.-MET  Pt's leg length discrepancy will be equalized.-MET     Long Term Goals: 4 weeks   Pt indep with self-treatment techniques.-MET  Pt's pain will be no higher than 2/10.-MET  Pt's flexibility of left Piriformis, ITB and Glutes will be WFL.-MET  Pt will be able to left laterally flex trunk without pain.-MET  Pt will gain 5 degrees left hip.-MET  Pt will have 4+/5 strength olivier hip flex and left hip abd.-MET    PLAN     Pt discharged this visit having met all goals.    Plan of care Certification: 2/15/2024 to 3/15/2024.     Outpatient Physical Therapy 2 times weekly for 4 weeks to include the following interventions: Electrical Stimulation (87793) Manual Therapy (66662), Moist Heat/Ice, Neuromuscular Re-Ed (97368), Patient Education, Therapeutic Exercise (50269), Ultrasound (59130)    Gabbie Weathers PT

## 2024-03-13 NOTE — PLAN OF CARE
PHYSICAL THERAPY DISCHARGE:    This patient was originally evaluated at our facility on: 2/15/24 for diagnosis of LBP.         Pt attended PT for a total of 7/9 Visits receiving: Manual Therapy, The alhaji, Postural Education, Body Mechanics Training, Home Exercise Instruction     This patient's last visit occurred on: 3/13/24      Short term goals were achieved:   Pt indep with HEP.-MET  Pt's pain will be no higher than 4/10.-MET  Pt's leg length discrepancy will be equalized.-MET    Long term goals were achieved:   Pt indep with self-treatment techniques.-MET  Pt's pain will be no higher than 2/10.-MET  Pt's flexibility of left Piriformis, ITB and Glutes will be WFL.-MET  Pt will be able to left laterally flex trunk without pain.-MET  Pt will gain 5 degrees left hip.-MET  Pt will have 4+/5 strength olivier hip flex and left hip abd.-MET  Pt was DC'd from our care secondary to: All goals fully met.       Therapist: Gabbie Weathers, PT  3/13/2024